# Patient Record
Sex: FEMALE | Race: WHITE | Employment: UNEMPLOYED | URBAN - METROPOLITAN AREA
[De-identification: names, ages, dates, MRNs, and addresses within clinical notes are randomized per-mention and may not be internally consistent; named-entity substitution may affect disease eponyms.]

---

## 2018-01-08 ENCOUNTER — HOSPITAL ENCOUNTER (OUTPATIENT)
Dept: MAMMOGRAPHY | Age: 58
Discharge: HOME | End: 2018-01-08
Attending: INTERNAL MEDICINE
Payer: COMMERCIAL

## 2018-01-08 ENCOUNTER — OFFICE VISIT (OUTPATIENT)
Dept: INTERNAL MEDICINE CLINIC | Age: 58
End: 2018-01-08

## 2018-01-08 VITALS
TEMPERATURE: 98.1 F | BODY MASS INDEX: 19.15 KG/M2 | DIASTOLIC BLOOD PRESSURE: 70 MMHG | OXYGEN SATURATION: 99 % | RESPIRATION RATE: 16 BRPM | HEIGHT: 67 IN | HEART RATE: 62 BPM | WEIGHT: 122 LBS | SYSTOLIC BLOOD PRESSURE: 112 MMHG

## 2018-01-08 DIAGNOSIS — K90.0 CELIAC DISEASE: ICD-10-CM

## 2018-01-08 DIAGNOSIS — Z12.11 COLON CANCER SCREENING: ICD-10-CM

## 2018-01-08 DIAGNOSIS — Z12.39 BREAST CANCER SCREENING: ICD-10-CM

## 2018-01-08 DIAGNOSIS — D35.2 PITUITARY ADENOMA (HCC): ICD-10-CM

## 2018-01-08 DIAGNOSIS — Z00.00 WELL WOMAN EXAM (NO GYNECOLOGICAL EXAM): Primary | ICD-10-CM

## 2018-01-08 PROCEDURE — 77063 BREAST TOMOSYNTHESIS BI: CPT

## 2018-01-08 NOTE — MR AVS SNAPSHOT
Visit Information Date & Time Provider Department Dept. Phone Encounter #  
 1/8/2018  1:30 PM Carlos Musa MD [facility] [address] [id number] Upcoming Health Maintenance Date Due Hepatitis C Screening 1960 COLONOSCOPY 1/24/1979 BREAST CANCER SCRN MAMMOGRAM 2/9/2017 DTaP/Tdap/Td series (2 - Td) 1/1/2020 Allergies as of 1/8/2018  Review Complete On: 1/8/2018 By: Carlos Musa MD  
  
 Severity Noted Reaction Type Reactions Contrast Agent [Iodine] Medium 05/12/2012   Topical Rash Penicillin G  08/28/2014    Other (comments) In childhood reaction unknown Current Immunizations  Reviewed on 1/8/2018 Name Date Hep A Vaccine (Adult) 4/6/2016 Tdap 1/1/2010 Reviewed by Carlos Musa MD on 1/8/2018 at  1:53 PM  
You Were Diagnosed With   
  
 Codes Comments Well woman exam (no gynecological exam)    -  Primary ICD-10-CM: Z00.00 ICD-9-CM: V70.0 Breast cancer screening     ICD-10-CM: Z12.31 
ICD-9-CM: V76.10 Colon cancer screening     ICD-10-CM: Z12.11 ICD-9-CM: V76.51 Celiac disease     ICD-10-CM: K90.0 ICD-9-CM: 579.0 Pituitary adenoma ([facility]     ICD-10-CM: D35.2 ICD-9-CM: 227.3 Vitals BP Pulse Temp Resp Height(growth percentile) Weight(growth percentile) 112/70 (BP 1 Location: Left arm, BP Patient Position: Sitting) 62 98.1 °F (36.7 °C) (Oral) 16 5' 7\" (1.702 m) 122 lb (55.3 kg) SpO2 BMI OB Status Smoking Status 99% 19.11 kg/m2 Hysterectomy Never Smoker Vitals History BMI and BSA Data Body Mass Index Body Surface Area  
 19.11 kg/m 2 1.62 m 2 Preferred Pharmacy [facility] [address] [phone] Your Updated Medication List  
  
   
This list is accurate as of: 1/8/18  1:57 PM.  Always use your most recent med list.  
  
  
  
  
 albuterol 90 mcg/actuation inhaler Commonly known as:  PROAIR HFA Take 1 Puff by inhalation every four (4) hours as needed for Wheezing or Shortness of Breath. We Performed the Following CBC WITH AUTOMATED DIFF [79901 CPT(R)] HEPATITIS C AB [86012 CPT(R)] LIPID PANEL [98086 CPT(R)] METABOLIC PANEL, COMPREHENSIVE [55942 CPT(R)] OCCULT BLOOD, IMMUNOASSAY (FIT) [id number] CPT(R)] PROLACTIN [98575 CPT(R)] T4, FREE [id number] CPT(R)] TSH 3RD GENERATION [01518 CPT(R)] URINALYSIS W/ RFLX MICROSCOPIC [87910 CPT(R)] To-Do List   
 01/08/2018 Imaging:  RENEE 3D DEE DEE W MAMMO BI SCREENING INCL CAD Introducing \[facility] [place] [facility]\"" & HEALTH SERVICES! [facility] introduces [employer] patient portal. Now you can access parts of your medical record, email your doctor's office, and request medication refills online. 1. In your internet browser, go to [email or web address] 2. Click on the First Time User? Click Here link in the Sign In box. You will see the New Member Sign Up page. 3. Enter your [employer] Access Code exactly as it appears below. You will not need to use this code after youve completed the sign-up process. If you do not sign up before the expiration date, you must request a new code. · [employer] Access Code: [email or web address] Expires: 4/8/2018  1:57 PM 
 
4. Enter the last four digits of your Social Security Number (xxxx) and Date of Birth (mm/dd/yyyy) as indicated and click Submit. You will be taken to the next sign-up page. 5. Create a [employer] ID. This will be your [employer] login ID and cannot be changed, so think of one that is secure and easy to remember. 6. Create a [employer] password. You can change your password at any time. 7. Enter your Password Reset Question and Answer. This can be used at a later time if you forget your password. 8. Enter your e-mail address. You will receive e-mail notification when new information is available in [address] 9. Click Sign Up. You can now view and download portions of your medical record. 10. Click the Download Summary menu link to download a portable copy of your medical information. If you have questions, please visit the Frequently Asked Questions section of the [employer] website. Remember, [employer] is NOT to be used for urgent needs. For medical emergencies, dial 911. Now available from your iPhone and Android! Please provide this summary of care documentation to your next provider. Your primary care clinician is listed as Lacey Varner. If you have any questions after today's visit, please call [phone].

## 2018-01-09 LAB
ALBUMIN SERPL-MCNC: 5 G/DL (ref 3.5–5.5)
ALBUMIN/GLOB SERPL: 2.1 {RATIO} (ref 1.2–2.2)
ALP SERPL-CCNC: 84 IU/L (ref 39–117)
ALT SERPL-CCNC: 13 IU/L (ref 0–32)
AST SERPL-CCNC: 16 IU/L (ref 0–40)
BASOPHILS # BLD AUTO: 0 X10E3/UL (ref 0–0.2)
BASOPHILS NFR BLD AUTO: 0 %
BILIRUB SERPL-MCNC: 0.8 MG/DL (ref 0–1.2)
BUN SERPL-MCNC: 10 MG/DL (ref 6–24)
BUN/CREAT SERPL: 14 (ref 9–23)
CALCIUM SERPL-MCNC: 9.7 MG/DL (ref 8.7–10.2)
CHLORIDE SERPL-SCNC: 99 MMOL/L (ref 96–106)
CHOLEST SERPL-MCNC: 195 MG/DL (ref 100–199)
CO2 SERPL-SCNC: 24 MMOL/L (ref 18–29)
CREAT SERPL-MCNC: 0.7 MG/DL (ref 0.57–1)
EOSINOPHIL # BLD AUTO: 0 X10E3/UL (ref 0–0.4)
EOSINOPHIL NFR BLD AUTO: 1 %
ERYTHROCYTE [DISTWIDTH] IN BLOOD BY AUTOMATED COUNT: 13.7 % (ref 12.3–15.4)
GLOBULIN SER CALC-MCNC: 2.4 G/DL (ref 1.5–4.5)
GLUCOSE SERPL-MCNC: 83 MG/DL (ref 65–99)
GLUCOSE UR QL: NORMAL
HCT VFR BLD AUTO: 43 % (ref 34–46.6)
HCV AB S/CO SERPL IA: <0.1 S/CO RATIO (ref 0–0.9)
HDLC SERPL-MCNC: 82 MG/DL
HGB BLD-MCNC: 13.9 G/DL (ref 11.1–15.9)
IMM GRANULOCYTES # BLD: 0 X10E3/UL (ref 0–0.1)
IMM GRANULOCYTES NFR BLD: 0 %
INTERPRETATION, 910389: NORMAL
KETONES UR QL STRIP: NORMAL
LDLC SERPL CALC-MCNC: 101 MG/DL (ref 0–99)
LYMPHOCYTES # BLD AUTO: 1.6 X10E3/UL (ref 0.7–3.1)
LYMPHOCYTES NFR BLD AUTO: 34 %
MCH RBC QN AUTO: 29.6 PG (ref 26.6–33)
MCHC RBC AUTO-ENTMCNC: 32.3 G/DL (ref 31.5–35.7)
MCV RBC AUTO: 92 FL (ref 79–97)
MONOCYTES # BLD AUTO: 0.4 X10E3/UL (ref 0.1–0.9)
MONOCYTES NFR BLD AUTO: 8 %
NEUTROPHILS # BLD AUTO: 2.8 X10E3/UL (ref 1.4–7)
NEUTROPHILS NFR BLD AUTO: 57 %
PH UR STRIP: NORMAL [PH]
PLATELET # BLD AUTO: 257 X10E3/UL (ref 150–379)
POTASSIUM SERPL-SCNC: 4 MMOL/L (ref 3.5–5.2)
PROLACTIN SERPL-MCNC: 19.7 NG/ML (ref 4.8–23.3)
PROT SERPL-MCNC: 7.4 G/DL (ref 6–8.5)
PROT UR QL STRIP: NORMAL
RBC # BLD AUTO: 4.69 X10E6/UL (ref 3.77–5.28)
SODIUM SERPL-SCNC: 141 MMOL/L (ref 134–144)
SP GR UR: NORMAL
T4 FREE SERPL-MCNC: 1.18 NG/DL (ref 0.82–1.77)
TRIGL SERPL-MCNC: 58 MG/DL (ref 0–149)
TSH SERPL DL<=0.005 MIU/L-ACNC: 1.83 UIU/ML (ref 0.45–4.5)
VLDLC SERPL CALC-MCNC: 12 MG/DL (ref 5–40)
WBC # BLD AUTO: 4.9 X10E3/UL (ref 3.4–10.8)

## 2018-01-09 NOTE — PROGRESS NOTES
Subjective:   65 y.o. female for Well Woman Check. No LMP recorded. Patient has had a hysterectomy. Social History: not sexually active. Pertinent past medical hstory: colonoscopy about 8 years ago-will try to get records from dr Dominique Harley  No recent mammo  Declines flu shot  No recent sxs-here because her  is encouraging her to get an exam.    Patient Active Problem List    Diagnosis Date Noted    Basal cell cancer 08/28/2014    Pituitary adenoma ([facility] 08/28/2014    Lupus erythematosus 08/28/2014    Celiac disease 08/28/2014    Alpha-1-antitrypsin deficiency carrier ([facility] 08/28/2014     Current Outpatient Prescriptions   Medication Sig Dispense Refill    albuterol (PROAIR HFA) 90 mcg/actuation inhaler Take 1 Puff by inhalation every four (4) hours as needed for Wheezing or Shortness of Breath. 1 Inhaler 0     Allergies   Allergen Reactions    Contrast Agent [Iodine] Rash    Penicillin G Other (comments)     In childhood reaction unknown     History reviewed. No pertinent past medical history. Past Surgical History:   Procedure Laterality Date    HX GYN      hysterectomy for fibroids age 22     History reviewed. No pertinent family history. Social History   Substance Use Topics    Smoking status: Never Smoker    Smokeless tobacco: Never Used    Alcohol use Yes      Comment: socially        ROS:  Feeling well. No dyspnea or chest pain on exertion. No abdominal pain, change in bowel habits, black or bloody stools. No urinary tract symptoms. GYN ROS: no breast pain or new or enlarging lumps on self exam, no vaginal bleeding, no discharge or pelvic pain. No neurological complaints. Objective:     Visit Vitals    /70 (BP 1 Location: Left arm, BP Patient Position: Sitting)    Pulse 62    Temp 98.1 °F (36.7 °C) (Oral)    Resp 16    Ht 5' 7\" (1.702 m)    Wt 122 lb (55.3 kg)    SpO2 99%    BMI 19.11 kg/m2     The patient appears well, alert, oriented x 3, in no distress.   ENT normal.  Neck supple. No adenopathy or thyromegaly. TAMERA. Lungs are clear, good air entry, no wheezes, rhonchi or rales. S1 and S2 normal, no murmurs, regular rate and rhythm. Abdomen soft without tenderness, guarding, mass or organomegaly. Extremities show no edema, normal peripheral pulses. Neurological is normal, no focal findings. BREAST EXAM: breasts appear normal, no suspicious masses, no skin or nipple changes or axillary nodes    PELVIC EXAM: examination not indicated    Assessment/Plan:   well woman  mammogram  Diagnoses and all orders for this visit:    1. Well woman exam (no gynecological exam)  -     METABOLIC PANEL, COMPREHENSIVE  -     LIPID PANEL  -     URINALYSIS W/ RFLX MICROSCOPIC  -     HEPATITIS C AB    2. Breast cancer screening  -     RENEE 3D DEE DEE W MAMMO BI SCREENING INCL CAD; Future    3. Colon cancer screening  -     OCCULT BLOOD, IMMUNOASSAY (FIT)  -     CBC WITH AUTOMATED DIFF    4. Celiac disease    5. Pituitary adenoma (HCC)-she stopped her cabergoline-denies any sxs of ha or visual changes or galactorrhea  -     TSH 3RD GENERATION  -     T4, FREE  -     PROLACTIN    .

## 2018-06-20 ENCOUNTER — OFFICE VISIT (OUTPATIENT)
Dept: INTERNAL MEDICINE CLINIC | Age: 58
End: 2018-06-20

## 2018-06-20 VITALS
RESPIRATION RATE: 16 BRPM | SYSTOLIC BLOOD PRESSURE: 97 MMHG | OXYGEN SATURATION: 98 % | BODY MASS INDEX: 19.62 KG/M2 | DIASTOLIC BLOOD PRESSURE: 47 MMHG | TEMPERATURE: 98.7 F | HEIGHT: 67 IN | WEIGHT: 125 LBS

## 2018-06-20 DIAGNOSIS — N75.8 BARTHOLIN'S GLAND INFECTION: Primary | ICD-10-CM

## 2018-06-20 RX ORDER — DOXYCYCLINE 100 MG/1
100 TABLET ORAL 2 TIMES DAILY
Qty: 14 TAB | Refills: 0 | Status: SHIPPED | OUTPATIENT
Start: 2018-06-20 | End: 2019-03-13 | Stop reason: ALTCHOICE

## 2018-06-20 NOTE — PROGRESS NOTES
Subjective:    Elizabeth Hi is a 63 y.o. female who presents for infected cyst right labia-present for last 2-3 days and increasing painful no fevers or discharge from [facility]:   Patient appears well. Visit Vitals    BP 97/47 (BP 1 Location: Left arm, BP Patient Position: Sitting)    Temp 98.7 °F (37.1 °C) (Oral)    Resp 16    Ht 5' 7\" (1.702 m)    Wt 125 lb (56.7 kg)    SpO2 98%    BMI 19.58 kg/m2     Skin: just below right labia majora she ha s a walnut sized firm red tender infected bartholins cyst no inguinal nodes    Assessment:   Infected bartholins cyst    Procedure Note:   After informed consent was obtained, using alcohol for cleansing and 1% lidocaine for anesthetic, with sterile technique, I and d of abscess was performed. Antibiotic dressing is applied, and wound care instructions provided. Be alert for any signs of cutaneous infection. The procedure was well tolerated without complications. Follow up: purulent material was drained and she tolerated it well. .     Started on keflex and advised use warm compresses to area  ,Follow up if signs and symptoms worsen or change. After hours number given.

## 2019-03-12 ENCOUNTER — TELEPHONE (OUTPATIENT)
Dept: INTERNAL MEDICINE CLINIC | Age: 59
End: 2019-03-12

## 2019-03-12 NOTE — TELEPHONE ENCOUNTER
Patient reports left eye swelling & pain that has been going on for 3 days now. She states day 1 her eye was tingling, the next day it was red & the 3rd day it was painful. She notes some vision changes yesterday but today her vision seems fine. Appt scheduled with PCP tomorrow at 2:30.

## 2019-03-12 NOTE — TELEPHONE ENCOUNTER
----- Message from Sidra Saenz sent at 3/12/2019  4:33 PM EDT -----  Regarding: Dr. Carlos Coyle  Pt requesting a call from nurse this evening regarding pain and swelling in her left eye. No appointments available tomorrow within pt's timeframe. Best contact is [phone].

## 2019-03-13 ENCOUNTER — OFFICE VISIT (OUTPATIENT)
Dept: INTERNAL MEDICINE CLINIC | Age: 59
End: 2019-03-13

## 2019-03-13 VITALS
TEMPERATURE: 98.2 F | HEIGHT: 67 IN | WEIGHT: 126 LBS | SYSTOLIC BLOOD PRESSURE: 123 MMHG | OXYGEN SATURATION: 98 % | BODY MASS INDEX: 19.78 KG/M2 | DIASTOLIC BLOOD PRESSURE: 77 MMHG | RESPIRATION RATE: 16 BRPM | HEART RATE: 63 BPM

## 2019-03-13 DIAGNOSIS — H00.015 HORDEOLUM EXTERNUM LEFT LOWER EYELID: Primary | ICD-10-CM

## 2019-03-13 RX ORDER — ERYTHROMYCIN 5 MG/G
OINTMENT OPHTHALMIC
Qty: 1 G | Refills: 1 | Status: ON HOLD | OUTPATIENT
Start: 2019-03-13 | End: 2019-11-05

## 2019-03-13 NOTE — PROGRESS NOTES
HISTORY OF PRESENT ILLNESS  Tomeka Weiner is a 63 y.o. female. HPI  48 hours of swelling and redness of left lower lid, no change in vision, no injury, no fevers or chills. A little puffiness underneath left eye. Review of Systems   Constitutional: Negative. Negative for chills, diaphoresis, fever and malaise/fatigue. HENT: Negative for congestion, ear discharge, ear pain, hearing loss, nosebleeds and sore throat. Eyes: Positive for redness. Negative for blurred vision, double vision, pain and discharge. Respiratory: Negative for cough, hemoptysis, sputum production, shortness of breath and wheezing. Cardiovascular: Negative for chest pain. Skin: Negative for rash. Neurological: Negative for headaches. Physical Exam   Constitutional: She is oriented to person, place, and time. She appears well-developed and well-nourished. HENT:   Head: Normocephalic. Right Ear: Tympanic membrane, external ear and ear canal normal.   Left Ear: Tympanic membrane, external ear and ear canal normal.   Nose: Nose normal.   Mouth/Throat: Oropharynx is clear and moist and mucous membranes are normal. No oropharyngeal exudate. Eyes: Conjunctivae are normal. Pupils are equal, round, and reactive to light. Right eye exhibits no discharge. Left eye exhibits no discharge. Left lower lid with papule and some redness of lid no discharge seen no facial tenderness   Neck: Normal range of motion. Neck supple. Cardiovascular: Normal rate, regular rhythm and normal heart sounds. Pulmonary/Chest: Effort normal and breath sounds normal. No respiratory distress. She has no wheezes. She has no rales. Lymphadenopathy:     She has no cervical adenopathy. Neurological: She is alert and oriented to person, place, and time. Skin: No rash noted. Nursing note and vitals reviewed. ASSESSMENT and PLAN  Diagnoses and all orders for this visit:    1.  Hordeolum externum left lower eyelid  -     erythromycin (ILOTYCIN) ophthalmic ointment; Topically to left lid tid    Compresses and lid scrubs  Follow up if signs and symptoms worsen or change. After hours number given.

## 2019-11-04 ENCOUNTER — HOSPITAL ENCOUNTER (INPATIENT)
Age: 59
LOS: 4 days | Discharge: HOME | DRG: 885 | End: 2019-11-08
Attending: EMERGENCY MEDICINE | Admitting: PSYCHIATRY & NEUROLOGY
Payer: COMMERCIAL

## 2019-11-04 ENCOUNTER — OFFICE VISIT (OUTPATIENT)
Dept: INTERNAL MEDICINE CLINIC | Age: 59
End: 2019-11-04

## 2019-11-04 ENCOUNTER — APPOINTMENT (OUTPATIENT)
Dept: CT IMAGING | Age: 59
DRG: 885 | End: 2019-11-04
Attending: EMERGENCY MEDICINE
Payer: COMMERCIAL

## 2019-11-04 VITALS
BODY MASS INDEX: 19.73 KG/M2 | TEMPERATURE: 98.3 F | RESPIRATION RATE: 16 BRPM | HEART RATE: 88 BPM | OXYGEN SATURATION: 98 % | HEIGHT: 67 IN | SYSTOLIC BLOOD PRESSURE: 150 MMHG | DIASTOLIC BLOOD PRESSURE: 103 MMHG

## 2019-11-04 DIAGNOSIS — D35.2 PITUITARY ADENOMA (HCC): ICD-10-CM

## 2019-11-04 DIAGNOSIS — F30.9 MANIA (HCC): ICD-10-CM

## 2019-11-04 DIAGNOSIS — I10 HTN, GOAL BELOW 130/80: ICD-10-CM

## 2019-11-04 DIAGNOSIS — F29 PSYCHOSIS, UNSPECIFIED PSYCHOSIS TYPE (HCC): Primary | ICD-10-CM

## 2019-11-04 DIAGNOSIS — R45.1 RESTLESSNESS AND AGITATION: Primary | ICD-10-CM

## 2019-11-04 PROBLEM — F31.10 BIPOLAR AFFECTIVE DISORDER, CURRENT EPISODE MANIC (HCC): Status: ACTIVE | Noted: 2019-11-04

## 2019-11-04 LAB
ALBUMIN SERPL-MCNC: 4 G/DL (ref 3.5–5)
ALBUMIN/GLOB SERPL: 1.2 {RATIO} (ref 1.1–2.2)
ALP SERPL-CCNC: 91 U/L (ref 45–117)
ALT SERPL-CCNC: 26 U/L (ref 12–78)
AMPHET UR QL SCN: NEGATIVE
ANION GAP SERPL CALC-SCNC: 6 MMOL/L (ref 5–15)
APPEARANCE UR: CLEAR
AST SERPL-CCNC: 15 U/L (ref 15–37)
BACTERIA URNS QL MICRO: NEGATIVE /HPF
BARBITURATES UR QL SCN: NEGATIVE
BASOPHILS # BLD: 0 K/UL (ref 0–0.1)
BASOPHILS NFR BLD: 0 % (ref 0–1)
BENZODIAZ UR QL: NEGATIVE
BILIRUB SERPL-MCNC: 0.3 MG/DL (ref 0.2–1)
BILIRUB UR QL: NEGATIVE
BUN SERPL-MCNC: 10 MG/DL (ref 6–20)
BUN/CREAT SERPL: 13 (ref 12–20)
CALCIUM SERPL-MCNC: 8.9 MG/DL (ref 8.5–10.1)
CANNABINOIDS UR QL SCN: NEGATIVE
CHLORIDE SERPL-SCNC: 105 MMOL/L (ref 97–108)
CK SERPL-CCNC: 72 U/L (ref 26–192)
CO2 SERPL-SCNC: 30 MMOL/L (ref 21–32)
COCAINE UR QL SCN: NEGATIVE
COLOR UR: ABNORMAL
CREAT SERPL-MCNC: 0.78 MG/DL (ref 0.55–1.02)
DIFFERENTIAL METHOD BLD: NORMAL
DRUG SCRN COMMENT,DRGCM: NORMAL
EOSINOPHIL # BLD: 0.1 K/UL (ref 0–0.4)
EOSINOPHIL NFR BLD: 1 % (ref 0–7)
EPITH CASTS URNS QL MICRO: ABNORMAL /LPF
ERYTHROCYTE [DISTWIDTH] IN BLOOD BY AUTOMATED COUNT: 12 % (ref 11.5–14.5)
ETHANOL SERPL-MCNC: <10 MG/DL
GLOBULIN SER CALC-MCNC: 3.4 G/DL (ref 2–4)
GLUCOSE SERPL-MCNC: 94 MG/DL (ref 65–100)
GLUCOSE UR STRIP.AUTO-MCNC: NEGATIVE MG/DL
HCT VFR BLD AUTO: 42.5 % (ref 35–47)
HGB BLD-MCNC: 13.5 G/DL (ref 11.5–16)
HGB UR QL STRIP: NEGATIVE
HYALINE CASTS URNS QL MICRO: ABNORMAL /LPF (ref 0–5)
IMM GRANULOCYTES # BLD AUTO: 0 K/UL (ref 0–0.04)
IMM GRANULOCYTES NFR BLD AUTO: 0 % (ref 0–0.5)
KETONES UR QL STRIP.AUTO: NEGATIVE MG/DL
LEUKOCYTE ESTERASE UR QL STRIP.AUTO: ABNORMAL
LYMPHOCYTES # BLD: 1.9 K/UL (ref 0.8–3.5)
LYMPHOCYTES NFR BLD: 34 % (ref 12–49)
MCH RBC QN AUTO: 29.6 PG (ref 26–34)
MCHC RBC AUTO-ENTMCNC: 31.8 G/DL (ref 30–36.5)
MCV RBC AUTO: 93.2 FL (ref 80–99)
METHADONE UR QL: NEGATIVE
MONOCYTES # BLD: 0.5 K/UL (ref 0–1)
MONOCYTES NFR BLD: 9 % (ref 5–13)
NEUTS SEG # BLD: 3 K/UL (ref 1.8–8)
NEUTS SEG NFR BLD: 56 % (ref 32–75)
NITRITE UR QL STRIP.AUTO: NEGATIVE
NRBC # BLD: 0 K/UL (ref 0–0.01)
NRBC BLD-RTO: 0 PER 100 WBC
OPIATES UR QL: NEGATIVE
PCP UR QL: NEGATIVE
PH UR STRIP: 7.5 [PH] (ref 5–8)
PLATELET # BLD AUTO: 250 K/UL (ref 150–400)
PMV BLD AUTO: 10.4 FL (ref 8.9–12.9)
POTASSIUM SERPL-SCNC: 3.2 MMOL/L (ref 3.5–5.1)
PROT SERPL-MCNC: 7.4 G/DL (ref 6.4–8.2)
PROT UR STRIP-MCNC: NEGATIVE MG/DL
RBC # BLD AUTO: 4.56 M/UL (ref 3.8–5.2)
RBC #/AREA URNS HPF: ABNORMAL /HPF (ref 0–5)
SODIUM SERPL-SCNC: 141 MMOL/L (ref 136–145)
SP GR UR REFRACTOMETRY: 1.01 (ref 1–1.03)
TROPONIN I SERPL-MCNC: <0.05 NG/ML
UR CULT HOLD, URHOLD: NORMAL
UROBILINOGEN UR QL STRIP.AUTO: 0.2 EU/DL (ref 0.2–1)
WBC # BLD AUTO: 5.5 K/UL (ref 3.6–11)
WBC URNS QL MICRO: ABNORMAL /HPF (ref 0–4)

## 2019-11-04 PROCEDURE — 65220000003 HC RM SEMIPRIVATE PSYCH

## 2019-11-04 PROCEDURE — 81001 URINALYSIS AUTO W/SCOPE: CPT

## 2019-11-04 PROCEDURE — 80307 DRUG TEST PRSMV CHEM ANLYZR: CPT

## 2019-11-04 PROCEDURE — 99284 EMERGENCY DEPT VISIT MOD MDM: CPT

## 2019-11-04 PROCEDURE — 36415 COLL VENOUS BLD VENIPUNCTURE: CPT

## 2019-11-04 PROCEDURE — 82550 ASSAY OF CK (CPK): CPT

## 2019-11-04 PROCEDURE — 84484 ASSAY OF TROPONIN QUANT: CPT

## 2019-11-04 PROCEDURE — 70450 CT HEAD/BRAIN W/O DYE: CPT

## 2019-11-04 PROCEDURE — 85025 COMPLETE CBC W/AUTO DIFF WBC: CPT

## 2019-11-04 PROCEDURE — 93005 ELECTROCARDIOGRAM TRACING: CPT

## 2019-11-04 PROCEDURE — 90791 PSYCH DIAGNOSTIC EVALUATION: CPT

## 2019-11-04 PROCEDURE — 80053 COMPREHEN METABOLIC PANEL: CPT

## 2019-11-04 RX ORDER — ADHESIVE BANDAGE
30 BANDAGE TOPICAL DAILY PRN
Status: DISCONTINUED | OUTPATIENT
Start: 2019-11-04 | End: 2019-11-08 | Stop reason: HOSPADM

## 2019-11-04 RX ORDER — LORAZEPAM 2 MG/ML
1 INJECTION INTRAMUSCULAR
Status: DISCONTINUED | OUTPATIENT
Start: 2019-11-04 | End: 2019-11-08 | Stop reason: HOSPADM

## 2019-11-04 RX ORDER — HYDROXYZINE 50 MG/1
50 TABLET, FILM COATED ORAL
Status: DISCONTINUED | OUTPATIENT
Start: 2019-11-04 | End: 2019-11-08 | Stop reason: HOSPADM

## 2019-11-04 RX ORDER — BENZTROPINE MESYLATE 1 MG/1
1 TABLET ORAL
Status: DISCONTINUED | OUTPATIENT
Start: 2019-11-04 | End: 2019-11-08 | Stop reason: HOSPADM

## 2019-11-04 RX ORDER — ACETAMINOPHEN 325 MG/1
650 TABLET ORAL
Status: DISCONTINUED | OUTPATIENT
Start: 2019-11-04 | End: 2019-11-08 | Stop reason: HOSPADM

## 2019-11-04 RX ORDER — DIPHENHYDRAMINE HYDROCHLORIDE 50 MG/ML
50 INJECTION, SOLUTION INTRAMUSCULAR; INTRAVENOUS
Status: DISCONTINUED | OUTPATIENT
Start: 2019-11-04 | End: 2019-11-08 | Stop reason: HOSPADM

## 2019-11-04 RX ORDER — OLANZAPINE 5 MG/1
5 TABLET ORAL
Status: DISCONTINUED | OUTPATIENT
Start: 2019-11-04 | End: 2019-11-08 | Stop reason: HOSPADM

## 2019-11-04 RX ORDER — HALOPERIDOL 5 MG/ML
5 INJECTION INTRAMUSCULAR
Status: DISCONTINUED | OUTPATIENT
Start: 2019-11-04 | End: 2019-11-08 | Stop reason: HOSPADM

## 2019-11-04 RX ORDER — TRAZODONE HYDROCHLORIDE 50 MG/1
50 TABLET ORAL
Status: DISCONTINUED | OUTPATIENT
Start: 2019-11-04 | End: 2019-11-05

## 2019-11-04 NOTE — ED PROVIDER NOTES
63 y.o. female with no significant past medical history who presents from home via personal vehicle with chief complaint of AMS. Patient history provided by . Patient took a trip to [facility] on Oct. 22nd, and while the her group in [place] she started yelling out and becoming agitated with people.  states the patient was left in her room and booked an early flight home, but was kicked off the flight due to possible agitation towards staff.  flew over to [facility] to  the patient, but upon arrival found out the patient had fled the airport and was missing in [place]. After 3 days, the patient was finally found and was able to be brought back to the [place] While coming back from airport in [facility] the patient was reportedly seeing \"angels\" and yelling at passengers.  states for the past 3 years the patient has yelled out in Jain often, but never saw it as a concern. Patient has had no previous psychiatric admissions or medications. Patient refuses to talk about her trip due to \"spiritual direction\" and was noncompliant with most questions asked. There are no other acute medical concerns at this time. Social hx: Social EtOH  PCP: Kash Owens MD    Full history, physical exam, and ROS unable to be obtained due to:  confusion. Note written by Emili Fulton, as dictated by Elenita Granados MD 4:18 PM       The history is provided by the patient and the spouse. The history is limited by the condition of the patient. No  was used. History reviewed. No pertinent past medical history. Past Surgical History:   Procedure Laterality Date    HX GYN      hysterectomy for fibroids age 22         History reviewed. No pertinent family history.     Social History     Socioeconomic History    Marital status:      Spouse name: Not on file    Number of children: Not on file    Years of education: Not on file    Highest education level: Not on file   Occupational History    Not on file   Social Needs    Financial resource strain: Not on file    Food insecurity:     Worry: Not on file     Inability: Not on file    Transportation needs:     Medical: Not on file     Non-medical: Not on file   Tobacco Use    Smoking status: Never Smoker    Smokeless tobacco: Never Used   Substance and Sexual Activity    Alcohol use: Yes     Comment: socially    Drug use: No    Sexual activity: Yes     Partners: Male   Lifestyle    Physical activity:     Days per week: Not on file     Minutes per session: Not on file    Stress: Not on file   Relationships    Social connections:     Talks on phone: Not on file     Gets together: Not on file     Attends Taoism service: Not on file     Active member of club or organization: Not on file     Attends meetings of clubs or organizations: Not on file     Relationship status: Not on file    Intimate partner violence:     Fear of current or ex partner: Not on file     Emotionally abused: Not on file     Physically abused: Not on file     Forced sexual activity: Not on file   Other Topics Concern    Not on file   Social History Narrative    Not on file         ALLERGIES: Contrast agent [iodine] and Penicillin g    Review of Systems   Unable to perform ROS: Mental status change       Vitals:    11/04/19 1533   BP: 151/75   Pulse: 71   Resp: 16   Temp: 97.9 °F (36.6 °C)   SpO2: 99%            Physical Exam   Constitutional: She is oriented to person, place, and time. She appears well-developed and well-nourished. No distress. HENT:   Head: Normocephalic and atraumatic. Right Ear: External ear normal.   Left Ear: External ear normal.   Eyes: Conjunctivae and EOM are normal.   Neck: Normal range of motion. Neck supple. No tracheal deviation present. No thyromegaly present. Cardiovascular: Normal rate, regular rhythm, normal heart sounds and intact distal pulses. Exam reveals no gallop and no friction rub.    No murmur heard.  Pulmonary/Chest: Effort normal and breath sounds normal. No respiratory distress. She has no wheezes. She has no rales. She exhibits no tenderness. Abdominal: Soft. Bowel sounds are normal. She exhibits no distension. There is no tenderness. Musculoskeletal: Normal range of motion. She exhibits no edema, tenderness or deformity. Neurological: She is alert and oriented to person, place, and time. She displays normal reflexes. No cranial nerve deficit. She exhibits normal muscle tone. Coordination normal.   Skin: Skin is warm and dry. No rash noted. She is not diaphoretic. No erythema. Psychiatric: She has a normal mood and affect. Her behavior is normal. Judgment and thought content normal.   Focused on Jainism topics. Not answering questions except redirecting answers towards Jainism topics. Note written by Emili Santos, as dictated by Norma Sánchez MD 4:18 PM       MDM  Number of Diagnoses or Management Options  Diagnosis management comments: 50-year-old white female presents with mental health problem. Patient has been having manic type symptoms. She is currently awake and alert and focused on Jainism topics. Will check basic blood work. Will have psychiatry see her. Will reassess shortly. Patient agrees. Patient is here with her  and currently calm and cooperative.        Amount and/or Complexity of Data Reviewed  Clinical lab tests: ordered and reviewed  Tests in the radiology section of CPT®: ordered and reviewed  Tests in the medicine section of CPT®: ordered and reviewed  Discussion of test results with the performing providers: yes  Decide to obtain previous medical records or to obtain history from someone other than the patient: yes  Obtain history from someone other than the patient: yes  Review and summarize past medical records: yes  Discuss the patient with other providers: yes  Independent visualization of images, tracings, or specimens: yes    Risk of Complications, Morbidity, and/or Mortality  Presenting problems: high  Diagnostic procedures: high  Management options: high           Procedures    ED EKG interpretation: 16:32  Rhythm: sinus bradycardia; and regular . Rate (approx.): 54; Axis: normal; ST/T wave: No ST elevation or depression;   Note written by Emili Maki, as dictated by Nkechi Leigh MD 4:43 PM     PROGRESS NOTE:  5:13 PM  According to [facility], crisis is coming to evaluate the patient    6:35 PM  Labs are WNL  UA is clear  CT head is negative  Pt is medically cleared    6:36 PM  Crisis is here to see pt     PROGRESS NOTE:  9:23 PM  Patient will be admitted here at [facility] to psychiatry

## 2019-11-04 NOTE — PROGRESS NOTES
HISTORY OF PRESENT ILLNESS  Benoit Morataya is a 63 y.o. female. HPI  Brought in by  and her , Father Margaret Foley. She has been having increasing agitation and behavioral abnormalities. Went to a [facility] in [facility] in October. She was on a tour and while there would not follow the tour guides and had to be watched by two people during all times. She flew back and was taken off the airplane because of her agitated behavior. She wound up spending two nights sleeping in the airport. Her  went at the end of October and got her from the airport. He found her and she had been wandering in the streets, confused and disoriented. She tells me that her  has taken her passport and that there is nothing wrong with her, but that she will only listen to what her , Father Margaret Foley, tells her what to do. She has been off Cabergoline for over a year. History of pituitary adenoma, but no diagnosis of mental health issues in the past.      Review of Systems   Unable to perform ROS: Psychiatric disorder       Physical Exam   Psychiatric:   Agitated throws her arms up and yells I am saved from the [place]  Also states her  took her passport  Tearful at times in the office and at other times yelling       ASSESSMENT and PLAN  Diagnoses and all orders for this visit:    1. Restlessness and agitation    2. HTN, goal below 130/80    3.  Pituitary adenoma ([address]    Behavior is erratic and nonsensical and I am worried re some type of manic episode versus psychosis  She is willing to  Travel with her  father Robina Harris who is in the office so he and her  Prentiss Vizcaino will take her directly to [facility] for further mental  Health evaluation  Discussed with  and  in office  Over 50% of the 25 minutes face to face with Benoit Morataya consisted of counseling and/or discussing treatment plans in reference to her erratic behavior and coordinating evaluation with  and  to get her to the er she was calm with them although agitated with m e.

## 2019-11-04 NOTE — BSMART NOTE
Patient is a 64yo female who presents to the ER due to mental health evaluation. She reports she is here due to her doctor, , and  wanting her to come. She denies suicidal and homicidal ideation. She will not answer if she sees or hears things other do not hear and refused to answer if she was seeing angels. She denies any reason to be in the hospital and denies any past mental health history.  reports she is here because she went to [facility] on 10/22 and there was an issue. Her [employer] group went to [facility] and the patient was yelling at random people and not following group instructions. They were concerned and had people watching her in her hotel room until the next day when they attempted to get her on a flight home. She was kicked off the plane and they tried to schedule with another airline and she was escorted off as well. She was to wait at the airport for her  to fly there to get there but when he arrived she was missing. A missing person report was filed and she was found after 2.5 days of wandering around the streets [facility]. Her  then flew home with her but she was talking about seeing angels and yelling at people and had to have her seats moved. She then was yelling at people on the shuttle. She admits to staying up at night praying and prays most of the day. Her  reports she rarely sleeps and he is having trouble keeping an eye on her and handling her at home. She keeps asking for her passport because she wants to travel again. He is concerned for her safety due to her hyper religiousness and Yazidism preoccupation. He gave the contact information for the  that was here with them and the patient gave consent for staff to contact him and share anything with him. His name is Father Landen Toussaint and his number is [phone]. Patient is not seeking admission and is not willing to stay.   has safety concerns due to delusional thoughts and preoccupation with Jain including not sleeping. After discussion with patient's  and MD it was decided to consult [address] [place] [address] for a TDO assessment. HPD is aware of patient and will continue to monitor. Despite report patient was observed yelling in the ER waiting room, she has been pleasant and cooperative with assessment and medical clearance process.  
 
Allan Orlando [facility]

## 2019-11-04 NOTE — ED TRIAGE NOTES
Pt here with  who stated she was in [facility] and was missing x 3 days, he stated she was kicked off the plane, he stated she was wondering in the streets, pt staring off and will not answer questions , pt acting  inappropriately in triage, pt throwing arms up in the air,  to stay with her ,security notified  of pt, pt screaming in waiting room

## 2019-11-05 LAB
APAP SERPL-MCNC: <2 UG/ML (ref 10–30)
ATRIAL RATE: 54 BPM
CALCULATED P AXIS, ECG09: 79 DEGREES
CALCULATED R AXIS, ECG10: 47 DEGREES
CALCULATED T AXIS, ECG11: 67 DEGREES
DIAGNOSIS, 93000: NORMAL
P-R INTERVAL, ECG05: 144 MS
PROLACTIN SERPL-MCNC: 9.6 NG/ML
Q-T INTERVAL, ECG07: 404 MS
QRS DURATION, ECG06: 76 MS
QTC CALCULATION (BEZET), ECG08: 383 MS
SALICYLATES SERPL-MCNC: <1.7 MG/DL (ref 2.8–20)
TSH SERPL DL<=0.05 MIU/L-ACNC: 1.36 UIU/ML (ref 0.36–3.74)
VENTRICULAR RATE, ECG03: 54 BPM

## 2019-11-05 PROCEDURE — 84146 ASSAY OF PROLACTIN: CPT

## 2019-11-05 PROCEDURE — 74011250637 HC RX REV CODE- 250/637: Performed by: NURSE PRACTITIONER

## 2019-11-05 PROCEDURE — 65220000003 HC RM SEMIPRIVATE PSYCH

## 2019-11-05 PROCEDURE — 84443 ASSAY THYROID STIM HORMONE: CPT

## 2019-11-05 PROCEDURE — 36415 COLL VENOUS BLD VENIPUNCTURE: CPT

## 2019-11-05 RX ORDER — RISPERIDONE 1 MG/1
1 TABLET, FILM COATED ORAL 2 TIMES DAILY
Status: DISCONTINUED | OUTPATIENT
Start: 2019-11-05 | End: 2019-11-05

## 2019-11-05 RX ORDER — TRAZODONE HYDROCHLORIDE 50 MG/1
50 TABLET ORAL
Status: DISCONTINUED | OUTPATIENT
Start: 2019-11-05 | End: 2019-11-08 | Stop reason: HOSPADM

## 2019-11-05 RX ORDER — POTASSIUM CHLORIDE 750 MG/1
40 TABLET, FILM COATED, EXTENDED RELEASE ORAL
Status: COMPLETED | OUTPATIENT
Start: 2019-11-05 | End: 2019-11-05

## 2019-11-05 RX ADMIN — POTASSIUM CHLORIDE 40 MEQ: 750 TABLET, FILM COATED, EXTENDED RELEASE ORAL at 11:51

## 2019-11-05 NOTE — PROGRESS NOTES
Laboratory Monitoring for Antipsychotics: This patient is currently prescribed the following medication(s):   Current Facility-Administered Medications   Medication Dose Route Frequency    risperiDONE (RisperDAL) tablet 1 mg  1 mg Oral BID     The following labs have been completed for monitoring of antipsychotics and/or mood stabilizers:    Height, Weight, BMI Estimation  Estimated body mass index is 19.73 kg/m² as calculated from the following:    Height as of an earlier encounter on 11/4/19: 170.2 cm (67\"). Weight as of 3/13/19: 57.2 kg (126 lb). Vital Signs/Blood Pressure  Visit Vitals  BP (!) 129/91 (BP 1 Location: Left arm, BP Patient Position: Sitting)   Pulse 76   Temp 98.3 °F (36.8 °C)   Resp 16   SpO2 99%     Renal Function, Hepatic Function and Chemistry  CrCl cannot be calculated (Unknown ideal weight.). Lab Results   Component Value Date/Time    Sodium 141 11/04/2019 04:02 PM    Potassium 3.2 (L) 11/04/2019 04:02 PM    Chloride 105 11/04/2019 04:02 PM    CO2 30 11/04/2019 04:02 PM    Anion gap 6 11/04/2019 04:02 PM    BUN 10 11/04/2019 04:02 PM    Creatinine 0.78 11/04/2019 04:02 PM    BUN/Creatinine ratio 13 11/04/2019 04:02 PM    Bilirubin, total 0.3 11/04/2019 04:02 PM    Protein, total 7.4 11/04/2019 04:02 PM    Albumin 4.0 11/04/2019 04:02 PM    Globulin 3.4 11/04/2019 04:02 PM    A-G Ratio 1.2 11/04/2019 04:02 PM    ALT (SGPT) 26 11/04/2019 04:02 PM    Alk.  phosphatase 91 11/04/2019 04:02 PM     Lab Results   Component Value Date/Time    Glucose 94 11/04/2019 04:02 PM     No results found for: HBA1C, HGBE8, ERW3QHJI    Hematology  Lab Results   Component Value Date/Time    WBC 5.5 11/04/2019 04:02 PM    RBC 4.56 11/04/2019 04:02 PM    HGB 13.5 11/04/2019 04:02 PM    HCT 42.5 11/04/2019 04:02 PM    MCV 93.2 11/04/2019 04:02 PM    MCH 29.6 11/04/2019 04:02 PM    MCHC 31.8 11/04/2019 04:02 PM    RDW 12.0 11/04/2019 04:02 PM    PLATELET 651 45/66/1952 04:02 PM     Lipids  Lab Results Component Value Date/Time    Cholesterol, total 195 01/08/2018 12:00 AM    HDL Cholesterol 82 01/08/2018 12:00 AM    LDL, calculated 101 (H) 01/08/2018 12:00 AM    Triglyceride 58 01/08/2018 12:00 AM     Thyroid Function  Lab Results   Component Value Date/Time    TSH 1.36 11/05/2019 06:30 AM    T4, Free 1.18 01/08/2018 12:00 AM    T4, Total 7.6 09/24/2009 08:12 AM     Assessment/Plan:  Will order lipid panel and hemoglobin A1c or fasting glucose to complete the recommended baseline laboratory monitoring based on the patient's current medication regimen. Will also ask for documentation of patient's weight.      Martine Ling, LAURELD

## 2019-11-05 NOTE — BH NOTES
Pt.s sister called  Stated pt.  Has history of Lupus  Pituitary tumor  Brain bleed   Herpes  And is concerned  If they are affecting her recent hospitalization

## 2019-11-05 NOTE — BH NOTES
GROUP THERAPY PROGRESS NOTE    The patient Marissa Kowalski is participating in [employer]. Group time: 30 minutes    Personal goal for participation: to orient the patient to the unit.     Goal orientation: successful adoption of unit rules    Group therapy participation: active    Therapeutic interventions reviewed and discussed: Yes    Impression of participation:     Sheree Yañez  11/5/2019 2:51 PM

## 2019-11-05 NOTE — ED NOTES
RN called  to come to bedside. Patient would like bible. RN searched patients purse and found a wine bottle inside. RN asked for bottle and patient refused. HPD to bedside to speak with patient. HPD spoke to patient and patient's .  now has patient's purse and bottle of wine. Pt state's \"its for my  who is coming in. \" HPD going to chapel to get bible. RN offered patient something to eat and drink. Pt declined at this time. Patient to be admitted upstairs once TDO comes.

## 2019-11-05 NOTE — ROUTINE PROCESS
TRANSFER - OUT REPORT: 
 
Verbal report given to Ute Tapia (name) on Sancho Salazar  being transferred to [address] [place] [address] (unit) for routine progression of care Report consisted of patients Situation, Background, Assessment and  
Recommendations(SBAR). Information from the following report(s) SBAR, ED Summary, [facility] and Recent Results was reviewed with the receiving nurse. Lines:    
 
Opportunity for questions and clarification was provided.    
 
Patient transported with: 
 Registered Nurse & HPD

## 2019-11-05 NOTE — BH NOTES
Patient admitted per TDO to Acute  Psychiatry, under the services of . Patient currently denies suicidal ideation. Patient currently denies homicidal ideation. Patient verbally contracts for safety. Patient admits with psychotic symptoms. Pt reports ETOH use, occasionally  Pt denies drug use.

## 2019-11-05 NOTE — PROGRESS NOTES
Admission Medication Reconciliation:    Information obtained from:  Patient interview and [facility]   RxQuery data available¹:  YES    Comments/Recommendations: Updated PTA meds/reviewed patient's allergies. 1)  The patient denies taking any medications prior to admission. 2)  Medication changes (since last review):  Removed  - albuterol  - erythromycin    3)  The [place] Prescription Monitoring Program () was assessed to determine fill history of any controlled medications. The patient has NOT filled any controlled medications in the last two years. ¹RxQuery pharmacy benefit data reflects medications filled and processed through the patient's insurance, however   this data does NOT capture whether the medication was picked up or is currently being taken by the patient. Allergies:  Contrast agent [iodine] and Penicillin g    Significant PMH/Disease States: History reviewed. No pertinent past medical history.   Chief Complaint for this Admission:    Chief Complaint   Patient presents with    Mental Health Problem     Prior to Admission Medications:   None     DEVENDRA Hawthorne

## 2019-11-05 NOTE — BH NOTES
Blocked Bed Documentation:    Room number: 977-53  Type: Behavior  Rationale: Impulsive  Anticipated duration: TBD  Additional comments:Religiously preoccupied.  Can be impulsive and disruptive

## 2019-11-05 NOTE — BH NOTES
PSYCHOSOCIAL ASSESSMENT  :Patient identifying info:  Giovanny Boggs is a 63 y.o., female admitted 11/4/2019  4:00 PM     Presenting problem and precipitating factors: She was brought to the ER by her  and  due to bizarre behavior. [address] assessed and TDO due to inability to care for self. She has been increasing agitated and bizarre,  She went to [facility] for 2 weeks and was asked to leave the tour due to her bizarre and intrusive Yazidism behavior , she was unable to fly home alone due to her behavior and left the airport on her own and was wandering around homeless , gave all her money away, no answering her phone and not using her credit care.  had to fly her home with his assistance. PCP and  have also been concerned about  her behavior. Mental status assessment: She is alert , oriented x's 3 , pleasant, cooperative and needs help with her treatment     Strengths: supportive family -     Collateral information:     Current psychiatric /substance abuse providers and contact info:     Previous psychiatric/substance abuse providers and response to treatment:     Family history of mental illness or substance abuse: no     Substance abuse history:    Social History     Tobacco Use    Smoking status: Never Smoker    Smokeless tobacco: Never Used   Substance Use Topics    Alcohol use: Yes     Comment: socially       History of biomedical complications associated with substance abuse :none noted     Patient's current acceptance of treatment or motivation for change:    Family constellation:  17 years- no children     Is significant other involved?        Describe support system:     Describe living arrangements and home environment:lives at home with her      Health issues:   Hospital Problems  Date Reviewed: 11/4/2019          Codes Class Noted POA    Bipolar affective disorder, current episode manic ([facility].) ICD-10-CM: F31.10  ICD-9-CM: 296.40 2019 Unknown              Trauma history: none noted     Legal issues: no    History of  service: no    Financial status:unemployed      Judaism/cultural factors: Temple   Education/work history:     Have you been licensed as a health care professional (current or ): yes , 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

## 2019-11-05 NOTE — ED NOTES
Verbal shift change report given to Saurabh DIAZ RN  (oncoming nurse) by Eleazar Tobar RN (offgoing nurse). Report included the following information SBAR and ED Summary.

## 2019-11-05 NOTE — INTERDISCIPLINARY ROUNDS
Behavioral Health Interdisciplinary Rounds Patient Name: Noa Torre  Age: 63 y.o. Room/Bed:  736/ Primary Diagnosis: <principal problem not specified> Admission Status: TDO Readmission within 30 days: no 
Power of  in place: no 
Patient requires a blocked bed: yes          Reason for blocked bed: behavior VTE Prophylaxis: No 
 
Mobility needs/Fall risk: no 
Flu Vaccine : no  
Nutritional Plan: no 
Consults:         
Labs/Testing due today?: yes Sleep hours: 2.25 Participation in Care/Groups:   
Medication Compliant?:  
PRNS (last 24 hours):    
Restraints (last 24 hours):  no 
  
CIWA (range last 24 hours): COWS (range last 24 hours): Alcohol screening (AUDIT) completed -   AUDIT Score: 3 If applicable, date SBIRT discussed in treatment team AND documented:  
AUDIT Screen Score: AUDIT Score: 3 Tobacco - patient is a smoker: Have You Used Tobacco in the Past 30 Days: No 
Illegal Drugs use: Have You Used Any Illegal Substances Over the Past 12 Months: No 
 
24 hour chart check complete: yes Patient goal(s) for today:  
Treatment team focus/goals: Plan to set up his hearing and assess for medications and discharge needs. Progress note : She was very religiously preoccupied and does not fell she needs to be here or take medications. LOS:  1  Expected LOS: TBD Financial concerns/prescription coverage:  [employer] Family contact: Chio Gage,  [phone] Family requesting physician contact today:   
Discharge plan: She will return home with her . Access to weapons : no Outpatient provider(s): TBD Patient's preferred phone number for follow up call :  
 
Participating treatment team members: Silvino Long RN - Reese Calhoun NP

## 2019-11-05 NOTE — BH NOTES
TRANSFER - IN REPORT:    Verbal report received from [facility] on [address]  being received from [address] [place] [address] for routine progression of care      Report consisted of patients Situation, Background, Assessment and   Recommendations(SBAR). Information from the following report(s) SBAR was reviewed with the receiving nurse. Opportunity for questions and clarification was provided. Assessment completed upon patients arrival to unit and care assumed.

## 2019-11-05 NOTE — PROGRESS NOTES
Problem: Psychosis  Goal: *STG: Remains safe in hospital  Outcome: Progressing Towards Goal  Note:   Out in milieu for short period during meal time. Preoccupied and bizarre at times. Shortly returns back to room. Staff will continue to monitor q 15 min checks.

## 2019-11-05 NOTE — PROGRESS NOTES
Blocked Bed Documentation:    Room number: 216  Type: Behavior  Rationale: Impulsive  Anticipated duration: reassess every shift  Additional comments:    Problem: Psychosis  Goal: *STG: Remains safe in hospital  Outcome: Progressing Towards Goal  Note:   Pt receiving continuous q15m safety checks, pt currently asleep resting comfortably in bed. No distress noted, respiratory WNL. Supplemental lighting utilized.

## 2019-11-05 NOTE — PROGRESS NOTES
[address] [facility]  Master Treatment Plan for [id number] [address]    Date Treatment Plan Initiated: 11/05/2019    Treatment Plan Modalities:  Type of Modality Amount  (x minutes) Frequency (x/week) Duration (x days) Name of Responsible Staff   Community & wrap-up meetings to encourage peer interactions 15 7 1 Rich CHO     Group psychotherapy to assist in building coping skills and internal controls 60 7 1 Steve Payan   Therapeutic activity groups to build coping skills 60 7 1 Steve Payan   Psychoeducation in group setting to address:   Medication education   [facility]. RN   Coping skills         Relaxation techniques         Symptom management         Discharge planning   60 2 [address]   Spirituality    61 2 1 lain Clent Cheadle NAMI   61 1 1 volunteer   Recovery/AA/NA      volunteer   Physician medication management   13 7 1 Dr. Jake Carlos meeting/discharge planning   15 2 [facility] and Rosa Philippe                                  Goals will be met by 11/12/2019    Problem: Psychosis  Goal: *STG: Decreased hallucinations  Outcome: Not Progressing Towards Goal  Note:   Patient is participatory in treatment team. Is religiously preoccupied/delusional. States that she doesn't understand why she is here. Believes that God is with her always and that she has many children that God gave her, although not biologically. Stated that while in [place] [facility] she was on \"a pilgrimage just as [place] was. It's hard to explain the journey\". Also stated that she will not take any medication (unless approved by her ) as she doesn't seem to see why she needs any. Otherwise, she is calm and cooperative on the unit. Denies SI/HI. Goal: *STG: Decreased delusional thinking  Outcome: Not Progressing Towards Goal  Note:   Delusional. Focused on her bible and Religion teachings.  Religiously preoccupied  Goal: *STG: Remains safe in hospital  Outcome: Progressing Towards Goal  Note:   Safe  Goal: *STG/LTG: Complies with medication therapy  Outcome: Progressing Towards Goal  Note:   Compliant  Goal: *STG/LTG: Demonstrates improved thought patterns as evidenced by logical and coherent speech  Outcome: Not Progressing Towards Goal  Note:   Does not demonstrate improved thought patterns  Goal: Interventions  Outcome: Progressing Towards Goal

## 2019-11-05 NOTE — BSMART NOTE
Patient has been determined to be a TDO per THE [facility]. Patient has been accepted as TDO to [facility] acute. Selena Parks 991

## 2019-11-05 NOTE — BH NOTES
GROUP THERAPY PROGRESS NOTE    [id number] [facility] Ernestina partially participated in an Acute Unit Process Group, with a focus on identifying feelings, planning for the rest of the day, and developing coping skills. Group time: 1993  8650. Personal goal for participation: To increase the capacity to improve ones mood and structure. Goal orientation: The patient will be able to identify their feelings, develop a plan for structuring their day, and practicing appropriate interaction with peers. Therapeutic interventions reviewed and discussed: The group members were asked to introduce themselves to each other and to see if they share their feelings and thoughts, and plans for the rest of their day. Impression of participation: With prompting, this patient marginally and passively participated in the group. She joined the group about five minutes after the start, was alert, and generally oriented. She expressed no current or active SI/HI. She may have been responding to internal stimuli and/or possible delusions of persecution. At one point she came right out and said she felt \"persecuted,\" but admitted that she could not put the words together to explain herself. She initially said she was feeling, \"Good. \" Her thinking was not easy to follow and sounded confused. She looked very thin, almost emaciated. She said she came into the hospital, \"because my father told me to. Zakia Mcfadden I do what he says. \" She also spoke about going home and returning to her parish, which she appeared to have some ambivalence about. Her affect was labile, mixed with anxiety, fear, paranoia, and mild euphoria. Her mood reflected her affect. This was the patient's first process group with the undersigned.

## 2019-11-06 LAB
CHOLEST SERPL-MCNC: 159 MG/DL
EST. AVERAGE GLUCOSE BLD GHB EST-MCNC: 114 MG/DL
HBA1C MFR BLD: 5.6 % (ref 4.2–6.3)
HDLC SERPL-MCNC: 51 MG/DL
HDLC SERPL: 3.1 {RATIO} (ref 0–5)
LDLC SERPL CALC-MCNC: 93 MG/DL (ref 0–100)
LIPID PROFILE,FLP: NORMAL
PROLACTIN SERPL-MCNC: 9.6 NG/ML
TRIGL SERPL-MCNC: 75 MG/DL (ref ?–150)
VLDLC SERPL CALC-MCNC: 15 MG/DL

## 2019-11-06 PROCEDURE — 82024 ASSAY OF ACTH: CPT

## 2019-11-06 PROCEDURE — 65220000003 HC RM SEMIPRIVATE PSYCH

## 2019-11-06 PROCEDURE — 83036 HEMOGLOBIN GLYCOSYLATED A1C: CPT

## 2019-11-06 PROCEDURE — 36415 COLL VENOUS BLD VENIPUNCTURE: CPT

## 2019-11-06 PROCEDURE — 80061 LIPID PANEL: CPT

## 2019-11-06 PROCEDURE — 84146 ASSAY OF PROLACTIN: CPT

## 2019-11-06 RX ORDER — ARIPIPRAZOLE 5 MG/1
5 TABLET ORAL DAILY
Status: DISCONTINUED | OUTPATIENT
Start: 2019-11-07 | End: 2019-11-08 | Stop reason: HOSPADM

## 2019-11-06 NOTE — PROGRESS NOTES
Problem: Psychosis  Goal: *STG: Remains safe in hospital  Outcome: Progressing Towards Goal  Note:   Out in milieu interacting with select peers. Smiling occasionally. Remains religiously preoccupied. Continues to politely refuse scheduled medications or MRI testing at this time. Pt did consume dinner. Remains 1:1 with staff.

## 2019-11-06 NOTE — PROGRESS NOTES
2305: Patient resting quietly in bed with eyes closed. No distress noted. Respirations are even and unlabored. Staff will continue to monitor q15 throughout the shift. Problem: Falls - Risk of  Goal: *Absence of Falls  Description  Document [facility] Fall Risk and appropriate interventions in the flowsheet.   Outcome: Progressing Towards Goal  Note:   Fall Risk Interventions:                              Blocked Bed Documentation:    Room number: 736  Type: Behavior  Rationale: Intrusive  Anticipated duration: TBD  Additional comments:

## 2019-11-06 NOTE — BH NOTES
[address] [facility]      Name:  Raul Madrid  MR#:  [id number]  :  1960  ACCOUNT #:  [de-identified]  ADMIT DATE:  2019      INITIAL PSYCHIATRIC EVALUATION    DATE OF SERVICE:  2019    CHIEF COMPLAINT:  \"I don't know why I'm here. \"    HISTORY OF PRESENT ILLNESS:  The patient is a 60-year-old female who is currently admitted at [address] [place] [address] on a temporary nursing home order basis. The patient has no prior psychiatry history, no history of mental illness. She is currently being followed by Dr. Hieu Huffman, her internal medicine doctor, and was diagnosed with pituitary adenoma. She saw her primary care physician yesterday. She was brought in by her  and her  due to increasing agitation and bizarre behaviors. The patient went on a pilgrimage to [facility] two weeks ago with her [employer] group. They went to [facility] and the patient was noted to be yelling at random people and not following the group instructions. Her [employer] group was very concerned and had people watching her in her hotel room until when they attempted to get her on a flight home. She was kicked off the plane, and they tried to schedule her flight with another airline, and she was escorted off it as well. She had to wait in the airport for her  to fly there to pick her up, but when her  arrived, she was missing. A missing person was filed and she was found after two-and-a-half days wandering around the streets. Her  was able to bring her back home, but she was talking about seeing angels and yelling at people and had to have her seats moved. She was also yelling at people on the shuttle. She has been religiously preoccupied. She states  that she stays up at night praying and prays most of the day. Her  reported that the patient has barely been sleeping. The patient is asking for her passport because she wants to travel again.   When her  found her, she was very confused and disoriented. She states that her  has taken her passport, and there is nothing wrong with her. During the interview, she was religiously preoccupied. She states that if God calls her to sleep, she will sleep like a baby. States that people are ignorant. She states that she has a lot of children, all children of God are her children. She also does not think she needs medications. All she needs is the blood and body of Pete.  Her thought process is very loose and disorganized. She is very acutely psychotic. Her speech is pressured. She was admitted to the Inpatient Psychiatric Unit for further evaluation and treatment. PAST MEDICAL HISTORY:   See H and P. PAST PSYCHIATRIC HOSPITALIZATION:  The patient has no prior psychiatric history or mental illness. This is her first psychiatric inpatient admission. PSYCHOSOCIAL HISTORY:  She has been  for 17 years. She has no children of her own. She is a housewife. MENTAL STATUS EXAMINATION:  She is alert and oriented in all spheres. She reports her mood is dysphoric. Affect is reactive. Speech is pressured. Thought process is loose and disorganized. Grandeur delusions are noted. Hallucinations are evident. Memory seems intact. Intelligence seems average. Insight is poor. Judgment is poor. DIAGNOSIS:  Unspecified psychotic disorder. Rule out schizophrenia. TREATMENT PLANNING:  I will continue her inpatient stay. She will be provided with support and encouraged to attend groups. Her safety will be monitored. Her medications will be modified and assessed. Case Management will work on discharge planning. ASSETS AND STRENGTHS:  She is willing to seek help. She is willing to take medications. ESTIMATED LENGTH OF STAY:  5-7 days.       GENNA PRASAD NP      [id number]  D:  11/05/2019 14:46  T:  11/05/2019 22:19  JOB #:  [id number]

## 2019-11-06 NOTE — BH NOTES
Received telephone call from pt spouse stating that 2 priests will come tomorrow to see pt. Father Parth Yadav will come between 11a -12p tomorrow.

## 2019-11-06 NOTE — BH NOTES
Blocked Bed Documentation:    Room number: 411-51  Type: Behavior  Rationale: Impulsive  Anticipated duration: TBD  Additional comments:Religiously delusional, agitation.

## 2019-11-06 NOTE — PROGRESS NOTES
Laboratory Monitoring for Antipsychotics: This patient is currently prescribed the following medication(s):   Current Facility-Administered Medications   Medication Dose Route Frequency    [START ON 11/7/2019] ARIPiprazole (ABILIFY) tablet 5 mg  5 mg Oral DAILY    traZODone (DESYREL) tablet 50 mg  50 mg Oral QHS     The following labs have been completed for monitoring of antipsychotics and/or mood stabilizers:    Height, Weight, BMI Estimation  Estimated body mass index is 18.48 kg/m² as calculated from the following:    Height as of an earlier encounter on 11/4/19: 170.2 cm (67\"). Weight as of this encounter: 53.5 kg (118 lb). Vital Signs/Blood Pressure  Visit Vitals  /84 (BP 1 Location: Left arm, BP Patient Position: Sitting)   Pulse 76   Temp 97.3 °F (36.3 °C)   Resp 16   Wt 53.5 kg (118 lb)   SpO2 96%   BMI 18.48 kg/m²     Renal Function, Hepatic Function and Chemistry  Estimated Creatinine Clearance: 66.4 mL/min (based on SCr of 0.78 mg/dL). Lab Results   Component Value Date/Time    Sodium 141 11/04/2019 04:02 PM    Potassium 3.2 (L) 11/04/2019 04:02 PM    Chloride 105 11/04/2019 04:02 PM    CO2 30 11/04/2019 04:02 PM    Anion gap 6 11/04/2019 04:02 PM    BUN 10 11/04/2019 04:02 PM    Creatinine 0.78 11/04/2019 04:02 PM    BUN/Creatinine ratio 13 11/04/2019 04:02 PM    Bilirubin, total 0.3 11/04/2019 04:02 PM    Protein, total 7.4 11/04/2019 04:02 PM    Albumin 4.0 11/04/2019 04:02 PM    Globulin 3.4 11/04/2019 04:02 PM    A-G Ratio 1.2 11/04/2019 04:02 PM    ALT (SGPT) 26 11/04/2019 04:02 PM    Alk.  phosphatase 91 11/04/2019 04:02 PM     Lab Results   Component Value Date/Time    Glucose 94 11/04/2019 04:02 PM     Lab Results   Component Value Date/Time    Hemoglobin A1c 5.6 11/06/2019 05:35 AM       Hematology  Lab Results   Component Value Date/Time    WBC 5.5 11/04/2019 04:02 PM    RBC 4.56 11/04/2019 04:02 PM    HGB 13.5 11/04/2019 04:02 PM    HCT 42.5 11/04/2019 04:02 PM    MCV 93.2 11/04/2019 04:02 PM    MCH 29.6 11/04/2019 04:02 PM    MCHC 31.8 11/04/2019 04:02 PM    RDW 12.0 11/04/2019 04:02 PM    PLATELET 988 80/01/6095 04:02 PM     Lipids  Lab Results   Component Value Date/Time    Cholesterol, total 159 11/06/2019 05:35 AM    HDL Cholesterol 51 11/06/2019 05:35 AM    LDL, calculated 93 11/06/2019 05:35 AM    Triglyceride 75 11/06/2019 05:35 AM    CHOL/HDL Ratio 3.1 11/06/2019 05:35 AM     Thyroid Function  Lab Results   Component Value Date/Time    TSH 1.36 11/05/2019 06:30 AM    T4, Free 1.18 01/08/2018 12:00 AM    T4, Total 7.6 09/24/2009 08:12 AM     Prolactin Level = 9.6 ng/mL (11/6/2019)    Pregnancy Status  No results found for: Jeimy Farfan [id number], [id number], [id number], PREGU, POCHCG, MHCGN, HCGQR, THCGA1, SHCG, HCGN, HCGURQLPOC    Assessment/Plan:  Recommended baseline laboratory monitoring has been completed based on this patient's current medication regimen. The patient's estimated 10-year ASCVD risk is 3.0%; therefore, the patient is not a candidate for a high-intensity statin. No further intervention needed at this time. Follow-up metabolic monitoring labs should be completed in 3 months (quarterly for the first year of antipsychotic therapy).      Barbie Hampton, LAURELD

## 2019-11-06 NOTE — BH NOTES
GROUP THERAPY PROGRESS NOTE    Asael Donis is participating in [place]. Group time: 15 minutes    Personal goal for participation: To reach gods purpose. Goal orientation: personal    Group therapy participation: active    Therapeutic interventions reviewed and discussed: Writer listened attentively and explained the unit routine. Impression of participation: Patient participated actively.

## 2019-11-06 NOTE — GROUP NOTE
[facility]  GROUP DOCUMENTATION INDIVIDUAL Group Therapy Note Date: November 5 Group Start Time: 2025 Group End Time: 2045 Group Topic: Reflection/Relaxation Issac Joiner Nurse, Angelita OCONNOR  GROUP DOCUMENTATION GROUP Group Therapy Note Attendance: Did not attend Beryle Sinner

## 2019-11-06 NOTE — BH NOTES
Blocked Bed Documentation:     Room number: 367-29  Type: Behavior  Rationale: Impulsive  Anticipated duration: TBD  Additional comments:

## 2019-11-06 NOTE — PROGRESS NOTES
Problem: Psychosis  Goal: *STG: Decreased delusional thinking  11/6/2019 0748 by Radha Cuevas RN  Outcome: Not Progressing Towards Goal  Note:   Patient is participatory in treatment team. Remains religiously preoccupied and delusional. Experiences spiritual moments in which she quickly lashes out as if being embodied \"by Ramila Hull". Continues to be adamant on taking any medication until she talks to her  about it. She was informed that an MRI would be ordered for ruling out any other issues, she then stated \"God believes me to be okay, so I don't want an MRI\".    11/6/2019 0745 by Radha Cuevas RN  Outcome: Not Progressing Towards Goal  Goal: *STG: Remains safe in hospital  Outcome: Progressing Towards Goal  Note:   Safe  Goal: *STG/LTG: Complies with medication therapy  Outcome: Not Progressing Towards Goal  Note:   Refuses to take medication unless approved by .  Goal: Interventions  Outcome: Progressing Towards Goal

## 2019-11-06 NOTE — BH NOTES
Staff informs pt about scheduled MRI ordered by NP. Staff attempts to educate pt about importance of having MRI done. Pt declines screening and MRI test at this time.

## 2019-11-06 NOTE — INTERDISCIPLINARY ROUNDS
Behavioral Health Interdisciplinary Rounds Patient Name: Jose De Jesus Ford  Age: 63 y.o. Room/Bed:  736/ Primary Diagnosis: <principal problem not specified> Admission Status: TDO Readmission within 30 days: no 
Power of  in place: no 
Patient requires a blocked bed: yes          Reason for blocked bed: behavior VTE Prophylaxis: No 
 
Mobility needs/Fall risk: no 
Flu Vaccine : no  
Nutritional Plan: no 
Consults:         
Labs/Testing due today?: yes-complete Sleep hours:  6.5 Participation in Care/Groups:  no 
Medication Compliant?: Selective PRNS (last 24 hours): None Restraints (last 24 hours):  no 
  
CIWA (range last 24 hours): COWS (range last 24 hours): Alcohol screening (AUDIT) completed -   AUDIT Score: 3 If applicable, date SBIRT discussed in treatment team AND documented:  
AUDIT Screen Score: AUDIT Score: 3 Document Brief Intervention (corresponds directly with the 5 A's, Ask, Advise, Assess, Assist, and Arrange): At- Risk Patients (Score 7-15 for women; 8-15 for men) Discuss concern patient is drinking at unhealthy levels known to increase risk of alcohol-related health problems. Is Patient ready to commit to change? If No: 
? Encourage reflection ? Discuss short term and long term health risks of consuming alcohol ? Barriers to change ? Reaffirm willingness to help / Educational materials provided If Yes: 
? Set goal 
? Plan 
? Educational materials provided Harmful use or Dependence (Score 16 or greater) ? Discuss short term and long term health risks of consuming alcohol ? Recommendations ? Negotiate drinking goal 
? Recommend addiction specialist/center ? Arrange follow-up appointments. Tobacco - patient is a smoker: Have You Used Tobacco in the Past 30 Days: No 
Illegal Drugs use: Have You Used Any Illegal Substances Over the Past 12 Months: No 
 
24 hour chart check complete: yes Patient goal(s) for today: Treatment team focus/goals:  
Progress note LOS:  2  Expected LOS:  
 
Financial concerns/prescription coverage:   
Family contact:      
Family requesting physician contact today:   
Discharge plan: Access to weapons :        
Outpatient provider(s):  
Patient's preferred phone number for follow up call :  
 
Participating treatment team members: Tisha Reeder, * (assigned SW),

## 2019-11-06 NOTE — BH NOTES
GROUP THERAPY PROGRESS NOTE    Beatrice Domínguez participated in an Acute Unit Process Group, with a focus on identifying feelings, planning for the rest of the day, and developing coping skills. Group time: 2564  8322. Personal goal for participation: To increase the capacity to improve ones mood and structure. Goal orientation: The patient will be able to identify their feelings, develop a plan for structuring their day, and practicing appropriate interaction with peers. Therapeutic interventions reviewed and discussed: The group members were asked to introduce themselves to each other and to see if they share their feelings and thoughts, and plans for the rest of their day. Impression of participation: With prompting, this patient participated in the group. She was alert, very generally oriented, and said she \"thought about going home this morning,\" which may have been a reference to her jail hearing where she was committed. She said she liked to spend her time \"reading and in prayer. \" She carried a brightly colored book with \"Prince\" in the title. She added that she has been waking up at 2 and 3 AM, but denied that this was a problem for her. The patient expressed no current SI/HI and may have been responding to internal stimuli or Rastafarian delusions, in short, she remained religiously preoccupied. Her affect was restricted and her mood was relatively calm. She may benefit from reality therapy, without directly challenging her Rastafarian delusions until after an appropriate medication regime is attempted and if her thinking is conflicting with her ability to reach the goals she wants for her life.

## 2019-11-07 LAB — ACTH PLAS-MCNC: 11 PG/ML (ref 7.2–63.3)

## 2019-11-07 PROCEDURE — 65220000003 HC RM SEMIPRIVATE PSYCH

## 2019-11-07 NOTE — BH NOTES
GROUP THERAPY PROGRESS NOTE    [id number] [facility] Ernestina partially participated in an Acute Unit Process Group, with a focus on identifying feelings, planning for the rest of the day, and developing coping skills. Group time: 4278  2702. Personal goal for participation: To increase the capacity to improve ones mood and structure. Goal orientation: The patient will be able to identify their feelings, develop a plan for structuring their day, and practicing appropriate interaction with peers. Therapeutic interventions reviewed and discussed: The group members were asked to introduce themselves to each other and to see if they share their feelings and thoughts, and plans for the rest of their day. Impression of participation: With prompting, this patient marginally participated in the group. She was alert, generally oriented, went into, back out, and returned to the group within the first fifteen minutes of the group. She expressed no current SI/HI and may have been responding to internal stimuli and delusions. She certainly remained religiously preoccupied. She said, for example, that she felt \"good\" and her goal for the day was \"to pray more and more. \" She brought a small devotional book with her to the group and was intent on reading a brief passage, mostly a collection of Biblical verses out of the studdex. Her affect was anxious and frightened. Her mood reflected her affect. She added that she enjoyed participating in adding pictures to the tree of animals in the dining area. She made an effort to interact with her peers on a mostly superficial level, even though a couple of peers were curious about the devotional book the patient seems to have with her in most process groups.

## 2019-11-07 NOTE — INTERDISCIPLINARY ROUNDS
Behavioral Health Interdisciplinary Rounds Patient Name: Sancho Salazar  Age: 63 y.o. Room/Bed:  736/ Primary Diagnosis: <principal problem not specified> Admission Status: TDO Readmission within 30 days: no 
Power of  in place: no 
Patient requires a blocked bed: yes          Reason for blocked bed: behavior VTE Prophylaxis: No 
 
Mobility needs/Fall risk: no 
Flu Vaccine : no  
Nutritional Plan: no 
Consults:         
Labs/Testing due today?: yes Sleep hours: 4.50 Participation in Care/Groups:  no 
Medication Compliant?: Selective PRNS (last 24 hours): None Restraints (last 24 hours):  no 
  
CIWA (range last 24 hours): COWS (range last 24 hours): Alcohol screening (AUDIT) completed -   AUDIT Score: 3 If applicable, date SBIRT discussed in treatment team AND documented:  
AUDIT Screen Score: AUDIT Score: 3 Tobacco - patient is a smoker: Have You Used Tobacco in the Past 30 Days: No 
Illegal Drugs use: Have You Used Any Illegal Substances Over the Past 12 Months: No 
 
24 hour chart check complete: yes Patient goal(s) for today:  
Treatment team focus/goals: Plan on forced medication hearing. Progress note: She refused medications, lab work and the MRI. She has been pleasant with her peers. \" what I need comes from the \" LOS:  3  Expected LOS:TBD Financial concerns/prescription coverage:  [employer] Family contact:  Aristeo Khan,  [phone] Family requesting physician contact today:   
Discharge plan: She will return home with her  Access to weapons :  no Outpatient provider(s): TBD Patient's preferred phone number for follow up call : 
 
Participating treatment team members: Nathaniel Mariee, CHET Miles,  PharmD.

## 2019-11-07 NOTE — PROGRESS NOTES
Problem: Discharge Planning  Goal: *Discharge to safe environment  Outcome: Progressing Towards Goal  Note:   She will return home with her       Problem: Discharge Planning  Goal: *Knowledge of medication management  Outcome: Not Progressing Towards Goal  Note:   She is refusing medications   Goal: *Knowledge of discharge instructions  Outcome: Not Progressing Towards Goal  Note:   At this time she is refusing psych follow up

## 2019-11-07 NOTE — BH NOTES
Pt is declining 24 hr urine at this time. Pt is stating she will consult with her [facility] tomorrow.

## 2019-11-07 NOTE — PROGRESS NOTES
In bed asleep with respirations noted as even and unlabored as chest was rising and falling. Will continue to monitor for safety and 15 minute checks throughout shift. Basilio Martinez

## 2019-11-07 NOTE — PROGRESS NOTES
PSYCHIATRIC PROGRESS NOTE       Patient: Noa Torre MRN: [id number]  SSN: [id number]    YOB: 1960  Age: 63 y.o. Sex: female      Admit Date: 11/4/2019    LOS: 3 days       Chief Complaint:  I need to pray all the time. Interval History:  Noa Torre is not making progress, she is profoundly preoccupied with Orthodoxy themes, about Jainism, holy spirit, her . Marisol Silva am what I am by the vik of God, what I need is only my .\" \"God calls me to the life of prayer. \" She believes she is not mentally ill, she is here in the hospital \"under observation. \" She is refusing abilify, she also refused MRI and other diagnostic tests. \"I dont need medication, the holy spirit will be terrified. \" States she is a  and her medical issues have been stable for years. Slept over 4 hours. Her thought process is disorganized, insight is very poor. We will petition for court ordered medication. Past Medical History:  History reviewed. No pertinent past medical history.       ALLERGIES:(reviewed/updated 11/7/2019)  Allergies   Allergen Reactions    Contrast Agent [Iodine] Rash    Penicillin G Other (comments)     In childhood reaction unknown       Laboratory report:  Lab Results   Component Value Date/Time    WBC 5.5 11/04/2019 04:02 PM    HGB 13.5 11/04/2019 04:02 PM    HCT 42.5 11/04/2019 04:02 PM    PLATELET 492 97/99/8996 04:02 PM    MCV 93.2 11/04/2019 04:02 PM      Lab Results   Component Value Date/Time    Sodium 141 11/04/2019 04:02 PM    Potassium 3.2 (L) 11/04/2019 04:02 PM    Chloride 105 11/04/2019 04:02 PM    CO2 30 11/04/2019 04:02 PM    Anion gap 6 11/04/2019 04:02 PM    Glucose 94 11/04/2019 04:02 PM    BUN 10 11/04/2019 04:02 PM    Creatinine 0.78 11/04/2019 04:02 PM    BUN/Creatinine ratio 13 11/04/2019 04:02 PM    GFR est AA >60 11/04/2019 04:02 PM    GFR est non-AA >60 11/04/2019 04:02 PM    Calcium 8.9 11/04/2019 04:02 PM    Bilirubin, total 0.3 11/04/2019 04:02 PM AST (SGOT) 15 11/04/2019 04:02 PM    Alk. phosphatase 91 11/04/2019 04:02 PM    Protein, total 7.4 11/04/2019 04:02 PM    Albumin 4.0 11/04/2019 04:02 PM    Globulin 3.4 11/04/2019 04:02 PM    A-G Ratio 1.2 11/04/2019 04:02 PM    ALT (SGPT) 26 11/04/2019 04:02 PM      Vitals:    11/06/19 0813 11/06/19 1600 11/06/19 1928 11/07/19 0746   BP: 145/84 119/82 118/79 122/80   Pulse: 76 78 80 90   Resp: 16 16 16 16   Temp: 97.3 °F (36.3 °C) 97.8 °F (36.6 °C) 98.2 °F (36.8 °C) 97.9 °F (36.6 °C)   SpO2:  99% 98%    Weight:           No results found for: VALF2, VALAC, VALP, VALPR, DS6, CRBAM, CRBAMP, CARB2, XCRBAM  No results found for: LITHM    Vital Signs  Patient Vitals for the past 24 hrs:   Temp Pulse Resp BP SpO2   11/07/19 0746 97.9 °F (36.6 °C) 90 16 122/80    11/06/19 1928 98.2 °F (36.8 °C) 80 16 118/79 98 %   11/06/19 1600 97.8 °F (36.6 °C) 78 16 119/82 99 %     Wt Readings from Last 3 Encounters:   11/05/19 53.5 kg (118 lb)   03/13/19 57.2 kg (126 lb)   06/20/18 56.7 kg (125 lb)     Temp Readings from Last 3 Encounters:   11/07/19 97.9 °F (36.6 °C)   11/04/19 98.3 °F (36.8 °C) (Oral)   03/13/19 98.2 °F (36.8 °C) (Oral)     BP Readings from Last 3 Encounters:   11/07/19 122/80   11/04/19 (!) 150/103   03/13/19 123/77     Pulse Readings from Last 3 Encounters:   11/07/19 90   11/04/19 88   03/13/19 63       Radiology (reviewed/updated 11/7/2019)  Ct Head Wo Cont    Result Date: 11/4/2019  EXAM: CT HEAD WO CONT INDICATION: confusion, berta COMPARISON: 2013 MR. CONTRAST: None. TECHNIQUE: Unenhanced CT of the head was performed using 5 mm images. Brain and bone windows were generated. CT dose reduction was achieved through use of a standardized protocol tailored for this examination and automatic exposure control for dose modulation. FINDINGS: The ventricles and sulci are normal in size, shape and configuration and midline. There is no significant white matter disease.  There is no intracranial hemorrhage, extra-axial collection, mass, mass effect or midline shift. The basilar cisterns are open. No acute infarct is identified. The bone windows demonstrate no abnormalities. The visualized portions of the paranasal sinuses and mastoid air cells are clear. IMPRESSION: No acute process identified       Side Effects: (reviewed/updated 11/7/2019)  None reported or admitted to. Review of Systems: (reviewed/updated 11/7/2019)  Appetite: good  Sleep: good   All other Review of Systems: religiously preoccupied    Mental Status Exam:  Eye contact: Good eye contact  Psychomotor activity: Anxious  Speech is spontaneous  Thought process: loose disorganized  Mood is \"ok\"  Affect: Full   Perception: No avh  Thought content: +paranoa/delusions, religiously preoccupied  Suicidal ideation: No si  Homicidal ideation: No hi  Insight/judgment: Poor  Cognition is grossly intact      Physical Exam:  Musculoskeletal system: steady gait  Tremor not present  Cog wheeling not present      Assessment and Plan:  Beatrice Domínguez meets criteria for a diagnosis of unspecified psychotic disorder. MRI of the brain, 24 hour urine cortisol. Start abilify 5mg po daily. Petition for court ordered medication. Continue current medications as prescribed. We will closely monitor for safety. We will encourage reality orientation. Disposition planning to continue. I certify that this patients inpatient psychiatric hospital services furnished since the previous certification were, and continue to be, required for treatment that could reasonably be expected to improve the patient's condition, or for diagnostic study, and that the patient continues to need, on a daily basis, active treatment furnished directly by or requiring the supervision of inpatient psychiatric facility personnel. In addition, the hospital records show that services furnished were intensive treatment services, admission or related services, or equivalent services. Signed:  Shawn Neal NP  11/7/2019

## 2019-11-07 NOTE — BH NOTES
Second Opinion (regarding): Capacity to refuse medications    Reason for Admission:  Justine Lomeli was admitted for severe psychosis and berta posing a risk of harm to herself and others. Diagnosis: Unspecified Psychosis    The patient does not recognize or acknowledge that she has a mental illness requiring medications to stabilize her mental status. The patient does not recognize the dangers of not taking the recommended psychiatric medications. She continues to refuse medications. Determination: Based on my independent evaluation of the patient on 11/7/2019, the patient does not have the capacity to refuse the psychiatric medications currently recommended for her illness. Recommendation: Referral to [facility] for Treatment Over Objection order. The patient does not have a surrogate decision maker or guardian to make these decisions on her behalf.       Signed By:   Deonte Garduno MD  11/7/2019

## 2019-11-07 NOTE — BH NOTES
Blocked Bed Documentation:    Room number: 824  Type: Behavior  Rationale: Impulsive  Anticipated duration: TBD

## 2019-11-07 NOTE — PROGRESS NOTES
Goal: *STG: Patient will verbalize areas in need of boundary recognition and limit setting  Outcome: Progressing Towards Goal    Goal: *STG/LTG: Complies with medication therapy  Outcome: Progressing Towards Goal    Problem: Psychosis  Goal: *STG: Decreased delusional thinking  Outcome: Progressing Towards Goal  Note:   0730 Pt resting quietly in bed. No distress noted. Will monitor with Q 15 safety checks. Pt refused scheduled Abilify, stating, \"I don't want to mess up what God has done. \"   Pt is smiling, pleasant, and calm. 1000 Pt ambivalent about MRI but ultimately refused.

## 2019-11-08 ENCOUNTER — APPOINTMENT (OUTPATIENT)
Dept: MRI IMAGING | Age: 59
DRG: 885 | End: 2019-11-08
Attending: NURSE PRACTITIONER
Payer: COMMERCIAL

## 2019-11-08 VITALS
TEMPERATURE: 98.2 F | BODY MASS INDEX: 18.48 KG/M2 | WEIGHT: 118 LBS | SYSTOLIC BLOOD PRESSURE: 119 MMHG | OXYGEN SATURATION: 99 % | RESPIRATION RATE: 16 BRPM | DIASTOLIC BLOOD PRESSURE: 82 MMHG | HEART RATE: 87 BPM

## 2019-11-08 PROCEDURE — A9585 GADOBUTROL INJECTION: HCPCS | Performed by: PSYCHIATRY & NEUROLOGY

## 2019-11-08 PROCEDURE — 74011000258 HC RX REV CODE- 258: Performed by: PSYCHIATRY & NEUROLOGY

## 2019-11-08 PROCEDURE — 74011250636 HC RX REV CODE- 250/636: Performed by: PSYCHIATRY & NEUROLOGY

## 2019-11-08 PROCEDURE — 77030021566 MRI BRAIN W WO CONT

## 2019-11-08 RX ADMIN — GADOBUTROL 5.5 ML: 604.72 INJECTION INTRAVENOUS at 14:59

## 2019-11-08 RX ADMIN — SODIUM CHLORIDE 100 ML: 900 INJECTION, SOLUTION INTRAVENOUS at 14:59

## 2019-11-08 NOTE — INTERDISCIPLINARY ROUNDS
Behavioral Health Interdisciplinary Rounds Patient Name: Lisbet Ortez  Age: 63 y.o. Room/Bed:  [facility] Primary Diagnosis: <principal problem not specified> Admission Status: TDO Readmission within 30 days: no 
Power of  in place: no 
Patient requires a blocked bed: yes          Reason for blocked bed: behavior VTE Prophylaxis: No 
 
Mobility needs/Fall risk: no 
Flu Vaccine : no  
Nutritional Plan: no 
Consults:         
Labs/Testing due today?: no 
 
Sleep hours:       
Participation in Care/Groups:  no 
Medication Compliant?: Selective PRNS (last 24 hours): None Restraints (last 24 hours):  no 
  
CIWA (range last 24 hours): COWS (range last 24 hours): Alcohol screening (AUDIT) completed -   AUDIT Score: 3 If applicable, date SBIRT discussed in treatment team AND documented:  
AUDIT Screen Score: AUDIT Score: 3 Tobacco - patient is a smoker: Have You Used Tobacco in the Past 30 Days: No 
Illegal Drugs use: Have You Used Any Illegal Substances Over the Past 12 Months: No 
 
24 hour chart check complete: yes Patient goal(s) for today:  
Treatment team focus/goals: Discharge AMA Progress note: plan to discharge today AMA  
 
LOS:  4  Expected LOS:4 days Financial concerns/prescription coverage:   
Family contact:     
Family requesting physician contact today:   
Discharge plan:she will return home with her  Access to weapons :  no Outpatient provider(s):refered to [facility] Patient's preferred phone number for follow up call : 
 
Participating treatment team members: Lisbet Ortez,  MICAH Bray RN

## 2019-11-08 NOTE — BH NOTES
GROUP THERAPY PROGRESS NOTE    Carol Rosales is participating in [place].      Group time: 10 minutes    Personal goal for participation: pray all day    Goal orientation: community    Group therapy participation: active    Therapeutic interventions reviewed and discussed: yes    Impression of participation: engaged

## 2019-11-08 NOTE — BH NOTES
Behavioral Health Transition Record to Provider    Patient Name: Justine Lomeli  YOB: 1960  Medical Record Number: [id number]  Date of Admission: 11/4/2019  Date of Discharge: 11/8/2019     Attending Provider: Alexis Stovall MD  Discharging Provider: Dr. Arya Cai   To contact this individual call [phone] and ask the  to page. If unavailable, ask to be transferred to [facility] Provider on call. [facility] Provider will be available on call 24/7 and during holidays. Primary Care Provider: Ez Salmon MD    Allergies   Allergen Reactions    Contrast Agent [Iodine] Rash    Penicillin G Other (comments)     In childhood reaction unknown       Reason for Admission: CHIEF COMPLAINT:  \"I don't know why I'm here. \"     HISTORY OF PRESENT ILLNESS:  The patient is a 52-year-old female who is currently admitted at [address] [place] [address] on a temporary penitentiary order basis. The patient has no prior psychiatry history, no history of mental illness. She is currently being followed by Dr. Mina Galvez, her internal medicine doctor, and was diagnosed with pituitary adenoma. She saw her primary care physician yesterday. She was brought in by her  and her  due to increasing agitation and bizarre behaviors. Admission Diagnosis: Bipolar affective disorder, current episode manic [facility] [F31.10]    * No surgery found *    Results for orders placed or performed during the hospital encounter of 11/04/19   URINE CULTURE HOLD SAMPLE   Result Value Ref Range    Urine culture hold        URINE ON HOLD IN MICROBIOLOGY DEPT FOR 3 DAYS. IF UNPRESERVED URINE IS SUBMITTED, IT CANNOT BE USED FOR ADDITIONAL TESTING AFTER 24 HRS, RECOLLECTION WILL BE REQUIRED.    CBC WITH AUTOMATED DIFF   Result Value Ref Range    WBC 5.5 3.6 - 11.0 K/uL    RBC 4.56 3.80 - 5.20 M/uL    HGB 13.5 11.5 - 16.0 g/dL    HCT 42.5 35.0 - 47.0 %    MCV 93.2 80.0 - 99.0 FL    MCH 29.6 26.0 - 34.0 PG    MCHC 31.8 30.0 - 36.5 g/dL    RDW 12.0 11.5 - 14.5 %    PLATELET 111 308 - 547 K/uL    MPV 10.4 8.9 - 12.9 FL    NRBC 0.0 0  WBC    ABSOLUTE NRBC 0.00 0.00 - 0.01 K/uL    NEUTROPHILS 56 32 - 75 %    LYMPHOCYTES 34 12 - 49 %    MONOCYTES 9 5 - 13 %    EOSINOPHILS 1 0 - 7 %    BASOPHILS 0 0 - 1 %    IMMATURE GRANULOCYTES 0 0.0 - 0.5 %    ABS. NEUTROPHILS 3.0 1.8 - 8.0 K/UL    ABS. LYMPHOCYTES 1.9 0.8 - 3.5 K/UL    ABS. MONOCYTES 0.5 0.0 - 1.0 K/UL    ABS. EOSINOPHILS 0.1 0.0 - 0.4 K/UL    ABS. BASOPHILS 0.0 0.0 - 0.1 K/UL    ABS. IMM. GRANS. 0.0 0.00 - 0.04 K/UL    DF AUTOMATED     METABOLIC PANEL, COMPREHENSIVE   Result Value Ref Range    Sodium 141 136 - 145 mmol/L    Potassium 3.2 (L) 3.5 - 5.1 mmol/L    Chloride 105 97 - 108 mmol/L    CO2 30 21 - 32 mmol/L    Anion gap 6 5 - 15 mmol/L    Glucose 94 65 - 100 mg/dL    BUN 10 6 - 20 MG/DL    Creatinine 0.78 0.55 - 1.02 MG/DL    BUN/Creatinine ratio 13 12 - 20      GFR est AA >60 >60 ml/min/1.73m2    GFR est non-AA >60 >60 ml/min/1.73m2    Calcium 8.9 8.5 - 10.1 MG/DL    Bilirubin, total 0.3 0.2 - 1.0 MG/DL    ALT (SGPT) 26 12 - 78 U/L    AST (SGOT) 15 15 - 37 U/L    Alk.  phosphatase 91 45 - 117 U/L    Protein, total 7.4 6.4 - 8.2 g/dL    Albumin 4.0 3.5 - 5.0 g/dL    Globulin 3.4 2.0 - 4.0 g/dL    A-G Ratio 1.2 1.1 - 2.2     DRUG SCREEN, URINE   Result Value Ref Range    AMPHETAMINES NEGATIVE  NEG      BARBITURATES NEGATIVE  NEG      BENZODIAZEPINES NEGATIVE  NEG      COCAINE NEGATIVE  NEG      METHADONE NEGATIVE  NEG      OPIATES NEGATIVE  NEG      PCP(PHENCYCLIDINE) NEGATIVE  NEG      THC (TH-CANNABINOL) NEGATIVE  NEG      Drug screen comment (NOTE)    URINALYSIS W/MICROSCOPIC   Result Value Ref Range    Color YELLOW/STRAW      Appearance CLEAR CLEAR      Specific gravity 1.007 1.003 - 1.030      pH (UA) 7.5 5.0 - 8.0      Protein NEGATIVE  NEG mg/dL    Glucose NEGATIVE  NEG mg/dL    Ketone NEGATIVE  NEG mg/dL    Bilirubin NEGATIVE  NEG      Blood NEGATIVE  NEG      Urobilinogen 0.2 0.2 - 1.0 EU/dL    Nitrites NEGATIVE  NEG      Leukocyte Esterase TRACE (A) NEG      WBC 0-4 0 - 4 /hpf    RBC 0-5 0 - 5 /hpf    Epithelial cells FEW FEW /lpf    Bacteria NEGATIVE  NEG /hpf    Hyaline cast 0-2 0 - 5 /lpf   ETHYL ALCOHOL   Result Value Ref Range    ALCOHOL(ETHYL),SERUM <10 <10 MG/DL   ACETAMINOPHEN   Result Value Ref Range    Acetaminophen level <2 (L) 10 - 30 ug/mL   CK W/ REFLX CKMB   Result Value Ref Range    CK 72 26 - 679 U/L   SALICYLATE   Result Value Ref Range    Salicylate level <7.3 (L) 2.8 - 20.0 MG/DL   TROPONIN I   Result Value Ref Range    Troponin-I, Qt. <0.05 <0.05 ng/mL   TSH 3RD GENERATION   Result Value Ref Range    TSH 1.36 0.36 - 3.74 uIU/mL   PROLACTIN   Result Value Ref Range    Prolactin 9.6 ng/mL   LIPID PANEL   Result Value Ref Range    LIPID PROFILE          Cholesterol, total 159 <200 MG/DL    Triglyceride 75 <150 MG/DL    HDL Cholesterol 51 MG/DL    LDL, calculated 93 0 - 100 MG/DL    VLDL, calculated 15 MG/DL    CHOL/HDL Ratio 3.1 0.0 - 5.0     HEMOGLOBIN A1C WITH EAG   Result Value Ref Range    Hemoglobin A1c 5.6 4.2 - 6.3 %    Est. average glucose 114 mg/dL   PROLACTIN   Result Value Ref Range    Prolactin 9.6 ng/mL   ACTH   Result Value Ref Range    ACTH, plasma 11.0 7.2 - 63.3 pg/mL   EKG, 12 LEAD, INITIAL   Result Value Ref Range    Ventricular Rate 54 BPM    Atrial Rate 54 BPM    P-R Interval 144 ms    QRS Duration 76 ms    Q-T Interval 404 ms    QTC Calculation (Bezet) 383 ms    Calculated P Axis 79 degrees    Calculated R Axis 47 degrees    Calculated T Axis 67 degrees    Diagnosis       Sinus bradycardia  No previous ECGs available  Confirmed by Waleska Hull M.D., Salvatore Osorio ([id number]) on 11/5/2019 6:00:09 AM         Immunizations administered during this encounter:   Immunization History   Administered Date(s) Administered    Hep A Vaccine (Adult) 04/06/2016    Tdap 01/01/2010       Screening for Metabolic Disorders for Patients on Antipsychotic Medications  (Data obtained from the EMR)    Estimated Body Mass Index  Estimated body mass index is 18.48 kg/m² as calculated from the following:    Height as of an earlier encounter on 11/4/19: 5' 7\" (1.702 m). Weight as of this encounter: 53.5 kg (118 lb). Vital Signs/Blood Pressure  Visit Vitals  /82 (BP 1 Location: Left arm, BP Patient Position: Sitting)   Pulse 87   Temp 98.2 °F (36.8 °C)   Resp 16   Wt 53.5 kg (118 lb)   SpO2 99%   BMI 18.48 kg/m²       Blood Glucose/Hemoglobin A1c  Lab Results   Component Value Date/Time    Glucose 94 11/04/2019 04:02 PM       Lab Results   Component Value Date/Time    Hemoglobin A1c 5.6 11/06/2019 05:35 AM        Lipid Panel  Lab Results   Component Value Date/Time    Cholesterol, total 159 11/06/2019 05:35 AM    HDL Cholesterol 51 11/06/2019 05:35 AM    LDL, calculated 93 11/06/2019 05:35 AM    Triglyceride 75 11/06/2019 05:35 AM    CHOL/HDL Ratio 3.1 11/06/2019 05:35 AM        Discharge Diagnosis: Unspecified psychotic disorder. Rule out schizophrenia. Discharge Plan: She will be discharge [facility] with her . The patient Esmer Garcia exhibits the ability to control behavior in a less restrictive environment. Patient's level of functioning is improving. No assaultive/destructive behavior has been observed for the past 24 hours. No suicidal/homicidal threat or behavior has been observed for the past 24 hours. There is no evidence of serious medication side effects. Patient has not been in physical or protective restraints for at least the past 24 hours. If weapons involved, how are they secured? No weapons involved     Is patient aware of and in agreement with discharge plan? Patient and family are aware of discharge and are in agreement    Arrangements for medication: no prescriptions given to patient.     Copy of discharge instructions to provider?:  Yes, fax to [facility]    Arrangements for transportation home:   to      Keep all follow up appointments as scheduled, continue to take prescribed medications per physician instructions. Mental health crisis number:  [phone] or your local mental health crisis line number at [phone]     Discharge Medication List and Instructions: There are no discharge medications for this patient. Unresulted Labs (24h ago, onward)    None        To obtain results of studies pending at discharge, please contact [phone]    Follow-up Information     Follow up With Specialties Details Why Contact Info    Royal Justin MD Internal Medicine Schedule an appointment as soon as possible for a visit  [facility] [phone]      [phone]  [facility]   you can walk in for same day access Monday 8am to 2 pm Tuesday - Thursday 8am to 4 pm and Friday 8am to 2 pm  [address]  [phone]          Advanced Directive:   Does the patient have an appointed surrogate decision maker? No  Does the patient have a Medical Advance Directive? No  Does the patient have a Psychiatric Advance Directive? No  If the patient does not have a surrogate or Medical Advance Directive AND Psychiatric Advance Directive, the patient was offered information on these advance directives Patient declined to complete    Patient Instructions: Please continue all medications until otherwise directed by physician. Tobacco Cessation Discharge Plan:   Is the patient a smoker and needs referral for smoking cessation? No  Patient referred to the following for smoking cessation with an appointment? No     Patient was offered medication to assist with smoking cessation at discharge? No  Was education for smoking cessation added to the discharge instructions? Yes    Alcohol/Substance Abuse Discharge Plan:   Does the patient have a history of substance/alcohol abuse and requires a referral for treatment?  No  Patient referred to the following for substance/alcohol abuse treatment with an appointment? No  Patient was offered medication to assist with alcohol cessation at discharge? No  Was education for substance/alcohol abuse added to discharge instructions? No    Patient discharged to Home; discussed with patient/caregiver and provided to the patient/caregiver either in hard copy or electronically.

## 2019-11-08 NOTE — PROGRESS NOTES
Pleasant and cooperative. Visible on unit. Hyper-Sabianism, appears to be responding to internal stimuli, although denies this. . Briefly visited with  this evening. Showered.    Problem: Psychosis  Goal: *STG: Patient will verbalize areas in need of boundary recognition and limit setting  Outcome: Not Progressing Towards Goal  Goal: *STG/LTG: Complies with medication therapy  Outcome: Not Progressing Towards Goal  Goal: *STG/LTG: Demonstrates improved thought patterns as evidenced by logical and coherent speech  Outcome: Not Progressing Towards Goal

## 2019-11-08 NOTE — PROGRESS NOTES
Problem: Psychosis  Goal: *STG/LTG: Complies with medication therapy  Outcome: Not Progressing Towards Goal  Note:   Pt alert oriented. Calm pleasant. Refuses scheduled oral medication. Education provided. Pt agrees to MRI without contrast; \"short one\". MRI screening completed.       Problem: Psychosis  Goal: *STG: Remains safe in hospital  Outcome: Progressing Towards Goal     Problem: Psychosis  Goal: Interventions  Outcome: Progressing Towards Goal

## 2019-11-08 NOTE — DISCHARGE SUMMARY
PSYCHIATRIC DISCHARGE SUMMARY      Patient: Meseret Navarro MRN: [id number]  SSN: [id number]    YOB: 1960  Age: 63 y.o. Sex: female        Date of Admission: 11/4/2019  Date of Discharge:11/8/2019       Type of Discharge:  REGULAR     Admission data:  CHIEF COMPLAINT:  \"I don't know why I'm here. \"     HISTORY OF PRESENT ILLNESS:  The patient is a 67-year-old female who is currently admitted at [address] [place] [address] on a temporary [facility] order basis. The patient has no prior psychiatry history, no history of mental illness. She is currently being followed by Dr. Nathan Broussard, her internal medicine doctor, and was diagnosed with pituitary adenoma. She saw her primary care physician yesterday. She was brought in by her  and her  due to increasing agitation and bizarre behaviors. The patient went on a pilgrimage to [facility] two weeks ago with her Islam group. They went to [facility] and the patient was noted to be yelling at random people and not following the group instructions. Her Islam group was very concerned and had people watching her in her hotel room until when they attempted to get her on a flight home. She was kicked off the plane, and they tried to schedule her flight with another airline, and she was escorted off it as well. She had to wait in the airport for her  to fly there to pick her up, but when her  arrived, she was missing. A missing person was filed and she was found after two-and-a-half days wandering around the streets. Her  was able to bring her back home, but she was talking about seeing angels and yelling at people and had to have her seats moved. She was also yelling at people on the shuttle. She has been religiously preoccupied. She states  that she stays up at night praying and prays most of the day. Her  reported that the patient has barely been sleeping.   The patient is asking for her passport because she wants to travel again. When her  found her, she was very confused and disoriented. She states that her  has taken her passport, and there is nothing wrong with her. During the interview, she was religiously preoccupied. She states that if God calls her to sleep, she will sleep like a baby. States that people are ignorant. She states that she has a lot of children, all children of God are her children. She also does not think she needs medications. All she needs is the blood and body of Pete.  Her thought process is very loose and disorganized. She is very acutely psychotic. Her speech is pressured. She was admitted to the Inpatient Psychiatric Unit for further evaluation and treatment.     PAST MEDICAL HISTORY:   See H and P.     PAST PSYCHIATRIC HOSPITALIZATION:  The patient has no prior psychiatric history or mental illness. This is her first psychiatric inpatient admission.     PSYCHOSOCIAL HISTORY:  She has been  for 17 years. She has no children of her own. She is a housewife.     MENTAL STATUS EXAMINATION:  She is alert and oriented in all spheres. She reports her mood is dysphoric. Affect is reactive. Speech is pressured. Thought process is loose and disorganized. Grandeur delusions are noted. Hallucinations are evident. Memory seems intact. Intelligence seems average. Insight is poor. Judgment is poor.     DIAGNOSIS:  Unspecified psychotic disorder. Rule out schizophrenia. Hospital Course:    Patient was admitted to the Psychiatric services for acute psychiatric stabilization in regards to symptomatology as described in the HPI above and placed on Q15 minute checks and suicide precautions. Standing medications were ordered. She was started on Abilify but she was not taking it. I had a long discussion with her  Halbert Duane yesterday evening, he informed me that patient is back at her baseline and he believes patient is safe for discharge.  He also declined to start the patient on any medication. When he visited last night, he wrote in the patient's care card that patient was doing really well and he was asking if he could take the patient home.  reports that the Hinduism preoccupation has been ongoing for years and what triggered the random yelling at people was patient's difficulty of sleeping. He did not show any concerns about patient's behaviors and he felt confident that patient was well enough to go home. Petition for court ordered meds got approved today, unfortunately  is also not receptive in pharmacological interventions despite the recommendation of the primary team. While on the unit Raj Ac was involved in individual, group, occupational and milieu therapy. She was hyper Hinduism, also observed talking to herself but calm and pleasant. She has not shown any management problem in the unit and was not considered a harm to herself or others. She was appropriate in her interactions, and cooperative with medications and the unit routine. Please see individual progress notes for more specific details regarding patient's hospitalization course. Patient was discharged as per 's request. I have discussed her case with Dr. Christy Young and he is also in agreement about the discharge. No PRN medication for agitation, seclusion or restraints were required during the last 48 hours of her stay. Raj Ac was safe and stable enough to be dishcharged and outpatient FU. At this time she did not offer any complaints. Patient denied any SI or HI. Denied any AH or VH. Remains religiously preoccupied. Was not considered a danger to self or to others and is safe for discharge. Will FU with her appointments and remains motivated to be in treatment. The patient verbalized understanding of her discharge instructions.          Allergies:(reviewed/updated 11/8/2019)  Allergies   Allergen Reactions    Contrast Agent [Iodine] Rash    Penicillin G Other (comments)     In childhood reaction unknown       Side Effects: (reviewed/updated 11/8/2019)  None reported or admitted to. Vital Signs:  Patient Vitals for the past 24 hrs:   Temp Pulse Resp BP SpO2   11/08/19 0811 98.2 °F (36.8 °C) 87 16 119/82    11/07/19 1951 97.6 °F (36.4 °C) 76 18 124/89 99 %   11/07/19 1550 98.3 °F (36.8 °C) 65 18 (!) 144/94 100 %     Wt Readings from Last 3 Encounters:   11/05/19 53.5 kg (118 lb)   11/08/19 55.1 kg (121 lb 8 oz)   03/13/19 57.2 kg (126 lb)     Temp Readings from Last 3 Encounters:   11/08/19 98.2 °F (36.8 °C)   11/04/19 98.3 °F (36.8 °C) (Oral)   03/13/19 98.2 °F (36.8 °C) (Oral)     BP Readings from Last 3 Encounters:   11/08/19 119/82   11/04/19 (!) 150/103   03/13/19 123/77     Pulse Readings from Last 3 Encounters:   11/08/19 87   11/04/19 88   03/13/19 63       Labs: (reviewed/updated 11/8/2019)  No results found for this or any previous visit (from the past 24 hour(s)). No results found for: VALF2, VALAC, VALP, VALPR, DS6, CRBAM, CRBAMP, CARB2, XCRBAM  No results found for: [facility] EVALUATION [place]    Radiology (reviewed/updated 11/8/2019)  Ct Head Wo Cont    Result Date: 11/4/2019  EXAM: CT HEAD WO CONT INDICATION: confusion, berta COMPARISON: 2013 MR. CONTRAST: None. TECHNIQUE: Unenhanced CT of the head was performed using 5 mm images. Brain and bone windows were generated. CT dose reduction was achieved through use of a standardized protocol tailored for this examination and automatic exposure control for dose modulation. FINDINGS: The ventricles and sulci are normal in size, shape and configuration and midline. There is no significant white matter disease. There is no intracranial hemorrhage, extra-axial collection, mass, mass effect or midline shift. The basilar cisterns are open. No acute infarct is identified. The bone windows demonstrate no abnormalities. The visualized portions of the paranasal sinuses and mastoid air cells are clear.      IMPRESSION: No acute process identified       Mental Status Exam on Discharge:  General appearance:   Jose De Jesus Ford is a 63 y.o. WHITE OR  female who is well groomed, psychomotor activity is WNL  Eye contact: makes good eye contact  Speech: Spontaneous and coherent  Affect : Euthymic  Mood: \"OK\"  Thought Process: Logical, goal directed  Perception: Denies any AH or VH. Thought Content: Denies any SI or Plan  Insight: Poor  Judgement: Poor  Cognition: Intact grossly. Discharge Diagnosis:   Unspecified psychotic disorder. There are no discharge medications for this patient. Follow-up Information     Follow up With Specialties Details Why Contact Info    Meghan Smith MD Internal Medicine Schedule an appointment as soon as possible for a visit  [facility] [phone]  [phone]      [facility]   you can walk in for same day access Monday 8am to 2 pm Tuesday - Thursday 8am to 4 pm and Friday 8am to 2 pm  [address]  [phone]        WOUND CARE: none needed. Prognosis:   Good / [place] based on nature of patient's pathology/ies and treatment compliance issues. Prognosis is greatly dependent upon patient's ability to  follow up on psychiatric/psychotherapy appointments as well as to comply with psychiatric medications as prescribed. I certify that this patients inpatient psychiatric hospital services furnished since the previous certification were, and continue to be, required for treatment that could reasonably be expected to improve the patient's condition, or for diagnostic study, and that the patient continues to need, on a daily basis, active treatment furnished directly by or requiring the supervision of inpatient psychiatric facility personnel. In addition, the hospital records show that services furnished were intensive treatment services, admission or related services, or equivalent services. Signed:  Berta Archer NP  11/8/2019

## 2019-11-08 NOTE — PROGRESS NOTES
Pharmacist Discharge Medication Reconciliation    Discharging Provider: Lynne Weeks NP    Significant PMH: History reviewed. No pertinent past medical history. Chief Complaint for this Admission:   Chief Complaint   Patient presents with    Mental Health Problem     Allergies: Contrast agent [iodine] and Penicillin g    Discharge Medications: There are no discharge medications for this patient.     The patient's chart, MAR and AVS were reviewed by Whitney Oakley PHARMD.

## 2019-11-08 NOTE — BH NOTES
Pt.  Had hearing on  Court ordered medications  Today  Pt.  Now ordered to take medications and allow tests prescribed by physican

## 2019-11-08 NOTE — BH NOTES
1410- pt leaving unit for MRI. BHT with pt.  1515- pt returns to unit from MRI. NAD. Alert oriented. Calm cooperative. Plan is discharge home with . No prescriptions provided. Follow up information provided, contact information provided. Opportunity for questions and clarification provided. Pt verbalizes understanding of information provided. Crisis plan reviewed; Pt verbalizes understanding.

## 2019-11-12 ENCOUNTER — PATIENT OUTREACH (OUTPATIENT)
Dept: FAMILY MEDICINE CLINIC | Age: 59
End: 2019-11-12

## 2019-11-12 NOTE — PROGRESS NOTES
Hospital Discharge Follow-Up      Date/Time:  11/12/2019 9:05 AM    Patient was admitted to [facility] [place] on 11/4/19 and discharged on 11/8/19 for bipolar affective disorder. The physician discharge summary was available at the time of outreach. Patient was contacted within two business days of discharge. Initial outreach encounter was unsuccessful. Top Challenges reviewed with the provider   Advance Care Planning:   Does patient have an Advance Directive:  not on file          Inpatient RRAT score: 0  Was this a readmission? no   Patient stated reason for the readmission: NA      Home Health orders at discharge: none      Medication(s):   New Medications at Discharge: NA  Changed Medications at Discharge: NA  Discontinued Medications at Discharge: NA  . Referral to Pharm D needed: no     [facility] follow up appointment(s): No future appointments.

## 2019-11-22 ENCOUNTER — PATIENT OUTREACH (OUTPATIENT)
Dept: FAMILY MEDICINE CLINIC | Age: 59
End: 2019-11-22

## 2019-11-22 NOTE — PROGRESS NOTES
Called patient to follow up  Female answered phone  CTN introduced self  CTN asked to confirm name/. . female   refused stated, \"you have that information in front of you and Im not doing that over the phone\"  States she is doing beautifully  Would not go over medications  Female stated, \"you have a ayse day.  I love you\"  Phone disconnected

## 2020-03-30 PROBLEM — Z86.018 HISTORY OF PITUITARY ADENOMA: Status: ACTIVE | Noted: 2020-03-30

## 2020-08-04 ENCOUNTER — HOSPITAL ENCOUNTER (OUTPATIENT)
Dept: LAB | Age: 60
Discharge: HOME | End: 2020-08-04

## 2020-08-04 ENCOUNTER — OFFICE VISIT (OUTPATIENT)
Dept: INTERNAL MEDICINE CLINIC | Age: 60
End: 2020-08-04
Payer: COMMERCIAL

## 2020-08-04 VITALS
SYSTOLIC BLOOD PRESSURE: 123 MMHG | HEIGHT: 67 IN | HEART RATE: 79 BPM | DIASTOLIC BLOOD PRESSURE: 87 MMHG | RESPIRATION RATE: 16 BRPM | BODY MASS INDEX: 18.52 KG/M2 | OXYGEN SATURATION: 98 % | WEIGHT: 118 LBS

## 2020-08-04 DIAGNOSIS — K90.0 CELIAC DISEASE: ICD-10-CM

## 2020-08-04 DIAGNOSIS — L72.0 INCLUSION CYST: ICD-10-CM

## 2020-08-04 DIAGNOSIS — R45.1 RESTLESSNESS AND AGITATION: ICD-10-CM

## 2020-08-04 DIAGNOSIS — D35.2 BENIGN NEOPLASM OF PITUITARY GLAND AND CRANIOPHARYNGEAL DUCT (POUCH) (HCC): ICD-10-CM

## 2020-08-04 DIAGNOSIS — K90.0 ADULT CELIAC DISEASE: ICD-10-CM

## 2020-08-04 DIAGNOSIS — D35.3 BENIGN NEOPLASM OF PITUITARY GLAND AND CRANIOPHARYNGEAL DUCT (POUCH) (HCC): ICD-10-CM

## 2020-08-04 DIAGNOSIS — F31.9 BIPOLAR 1 DISORDER (HCC): ICD-10-CM

## 2020-08-04 DIAGNOSIS — D35.2 PITUITARY ADENOMA (HCC): ICD-10-CM

## 2020-08-04 DIAGNOSIS — D35.2 PITUITARY ADENOMA (HCC): Primary | ICD-10-CM

## 2020-08-04 LAB
ALBUMIN SERPL-MCNC: 4.7 G/DL (ref 3.5–5)
ALBUMIN/GLOB SERPL: 1.5 {RATIO} (ref 1.1–2.2)
ALP SERPL-CCNC: 104 U/L (ref 45–117)
ALT SERPL-CCNC: 17 U/L (ref 12–78)
ANION GAP SERPL CALC-SCNC: 8 MMOL/L (ref 5–15)
AST SERPL-CCNC: 13 U/L (ref 15–37)
BASOPHILS # BLD: 0 K/UL (ref 0–0.1)
BASOPHILS NFR BLD: 0 % (ref 0–1)
BILIRUB SERPL-MCNC: 0.6 MG/DL (ref 0.2–1)
BUN SERPL-MCNC: 9 MG/DL (ref 6–20)
BUN/CREAT SERPL: 12 (ref 12–20)
CALCIUM SERPL-MCNC: 9.4 MG/DL (ref 8.5–10.1)
CHLORIDE SERPL-SCNC: 107 MMOL/L (ref 97–108)
CO2 SERPL-SCNC: 24 MMOL/L (ref 21–32)
COMMENT, HOLDF: NORMAL
CREAT SERPL-MCNC: 0.74 MG/DL (ref 0.55–1.02)
DIFFERENTIAL METHOD BLD: NORMAL
EOSINOPHIL # BLD: 0 K/UL (ref 0–0.4)
EOSINOPHIL NFR BLD: 0 % (ref 0–7)
ERYTHROCYTE [DISTWIDTH] IN BLOOD BY AUTOMATED COUNT: 11.8 % (ref 11.5–14.5)
GLOBULIN SER CALC-MCNC: 3.2 G/DL (ref 2–4)
GLUCOSE SERPL-MCNC: 80 MG/DL (ref 65–100)
HCT VFR BLD AUTO: 42.8 % (ref 35–47)
HGB BLD-MCNC: 13.9 G/DL (ref 11.5–16)
IMM GRANULOCYTES # BLD AUTO: 0 K/UL (ref 0–0.04)
IMM GRANULOCYTES NFR BLD AUTO: 0 % (ref 0–0.5)
LYMPHOCYTES # BLD: 1.3 K/UL (ref 0.8–3.5)
LYMPHOCYTES NFR BLD: 25 % (ref 12–49)
MCH RBC QN AUTO: 29.9 PG (ref 26–34)
MCHC RBC AUTO-ENTMCNC: 32.5 G/DL (ref 30–36.5)
MCV RBC AUTO: 92 FL (ref 80–99)
MONOCYTES # BLD: 0.4 K/UL (ref 0–1)
MONOCYTES NFR BLD: 8 % (ref 5–13)
NEUTS SEG # BLD: 3.3 K/UL (ref 1.8–8)
NEUTS SEG NFR BLD: 67 % (ref 32–75)
NRBC # BLD: 0 K/UL (ref 0–0.01)
NRBC BLD-RTO: 0 PER 100 WBC
PLATELET # BLD AUTO: 224 K/UL (ref 150–400)
PMV BLD AUTO: 11.7 FL (ref 8.9–12.9)
POTASSIUM SERPL-SCNC: 4 MMOL/L (ref 3.5–5.1)
PROLACTIN SERPL-MCNC: 7.6 NG/ML
PROT SERPL-MCNC: 7.9 G/DL (ref 6.4–8.2)
RBC # BLD AUTO: 4.65 M/UL (ref 3.8–5.2)
SAMPLES BEING HELD,HOLD: NORMAL
SODIUM SERPL-SCNC: 139 MMOL/L (ref 136–145)
T4 FREE SERPL-MCNC: 1.1 NG/DL (ref 0.8–1.5)
TSH SERPL DL<=0.05 MIU/L-ACNC: 2.15 UIU/ML (ref 0.36–3.74)
WBC # BLD AUTO: 5 K/UL (ref 3.6–11)

## 2020-08-04 PROCEDURE — 99214 OFFICE O/P EST MOD 30 MIN: CPT | Performed by: INTERNAL MEDICINE

## 2020-08-04 NOTE — PROGRESS NOTES
HISTORY OF PRESENT ILLNESS  Beatrice Domínguez is a 62 y.o. female. HPI  She has not been seen since November, when she was hospitalized at [address] [place] [address] with psychotic manic episode, was diagnosed with bipolar disorder with berta. She had a lot of hyper-Jew talk at that time. She declined medication, has not had follow up since that time. Comes in now, really concerned about a cyst on her back, which has grown. It is not draining or painful, no fevers. Does have a history of a pituitary microadenoma. Denies changes in vision or headaches, denies trouble sleeping. When asked, she tells me she prays when she needs to at night and sleeps when God lets her. Denies concerns about her mood. She has recently been spending a lot of time at the beach. Review of Systems   Constitutional: Negative for chills, fever, malaise/fatigue and weight loss. Respiratory: Negative for cough, shortness of breath and wheezing. Cardiovascular: Negative for chest pain, palpitations, orthopnea, leg swelling and PND. Gastrointestinal: Negative for heartburn and nausea. Musculoskeletal: Negative for myalgias. Neurological: Negative for dizziness and headaches. Psychiatric/Behavioral: Negative for depression. The patient is not nervous/anxious and does not have insomnia. Physical Exam  Vitals signs and nursing note reviewed. Constitutional:       Appearance: She is well-developed. HENT:      Head: Normocephalic and atraumatic. Neck:      Musculoskeletal: Normal range of motion and neck supple. Thyroid: No thyromegaly. Vascular: No carotid bruit. Cardiovascular:      Rate and Rhythm: Normal rate and regular rhythm. Heart sounds: Normal heart sounds, S1 normal and S2 normal. No murmur. Pulmonary:      Effort: Pulmonary effort is normal. No respiratory distress. Breath sounds: Normal breath sounds. No wheezing or rales.    Skin:     Comments: Small inclusion  Cyst upper back  Midline no drainage and not red or warm   Neurological:      Mental Status: She is alert and oriented to person, place, and time. Psychiatric:      Comments:  Speaks about god and praying often during our visit         ASSESSMENT and PLAN  Diagnoses and all orders for this visit:    1. [facility]  -     TSH 3RD GENERATION; Future  -     PROLACTIN; Future  -     T4, FREE; Future  -     CBC WITH AUTOMATED DIFF; Future    2. Restlessness and agitation-with ongoing sxs of berta  Mild-she declines care     3. Adult celiac disease  -     METABOLIC PANEL, COMPREHENSIVE; Future    4.  Inclusion cyst  -     REFERRAL TO GENERAL SURGERY    5. Bipolar 1 disorder ([facility]

## 2021-04-24 ENCOUNTER — IMMUNIZATION (OUTPATIENT)
Dept: INTERNAL MEDICINE CLINIC | Age: 61
End: 2021-04-24
Payer: COMMERCIAL

## 2021-04-24 DIAGNOSIS — Z23 ENCOUNTER FOR IMMUNIZATION: Primary | ICD-10-CM

## 2021-04-24 PROCEDURE — 0001A COVID-19, MRNA, LNP-S, PF, 30MCG/0.3ML DOSE(PFIZER): CPT | Performed by: FAMILY MEDICINE

## 2021-04-24 PROCEDURE — 91300 COVID-19, MRNA, LNP-S, PF, 30MCG/0.3ML DOSE(PFIZER): CPT | Performed by: FAMILY MEDICINE

## 2021-05-15 ENCOUNTER — IMMUNIZATION (OUTPATIENT)
Dept: INTERNAL MEDICINE CLINIC | Age: 61
End: 2021-05-15
Payer: COMMERCIAL

## 2021-05-15 DIAGNOSIS — Z23 ENCOUNTER FOR IMMUNIZATION: Primary | ICD-10-CM

## 2021-05-15 PROCEDURE — 0002A COVID-19, MRNA, LNP-S, PF, 30MCG/0.3ML DOSE(PFIZER): CPT | Performed by: FAMILY MEDICINE

## 2021-05-15 PROCEDURE — 91300 COVID-19, MRNA, LNP-S, PF, 30MCG/0.3ML DOSE(PFIZER): CPT | Performed by: FAMILY MEDICINE

## 2023-11-17 NOTE — DISCHARGE INSTRUCTIONS
DISCHARGE SUMMARY    NAME:Leslie Mesa  :   MRN: [id number]    The patient Lisbet Ortez exhibits the ability to control behavior in a less restrictive environment. Patient's level of functioning is improving. No assaultive/destructive behavior has been observed for the past 24 hours. No suicidal/homicidal threat or behavior has been observed for the past 24 hours. There is no evidence of serious medication side effects. Patient has not been in physical or protective restraints for at least the past 24 hours. If weapons involved, how are they secured? No weapons involved     Is patient aware of and in agreement with discharge plan? Patient and family are aware of discharge and are in agreement    Arrangements for medication: no prescriptions given to patient. Copy of discharge instructions to provider?:  Yes, fax to [address] [place] [address] for transportation home:   to      Keep all follow up appointments as scheduled, continue to take prescribed medications per physician instructions. Mental health crisis number:  [phone] or your local mental health crisis line number at [phone]     Patient is discharging: No prescriptions provided. Patient elected to not take medications during admission. DISCHARGE SUMMARY from Nurse    PATIENT INSTRUCTIONS:    What to do at Home:  Recommended activity: Activity as tolerated. If you experience any of the following symptoms hopeless, helpless, overwhelming thoughts of harming self or others, uncontrolled racing thoughts or Zoroastrianism thoughts, please call 911 or your local mental health crisis line number at [phone]. *  Please give a list of your current medications to your Primary Care Provider. *  Please update this list whenever your medications are discontinued, doses are      changed, or new medications (including over-the-counter products) are added.     *  Please carry medication information at all times in case of emergency Sheyla JomarurielCathleen is a 38 y.o. female who presents today for annual physical. Age appropriate screening and routine maintenance reviewed and updated today.    situations. These are general instructions for a healthy lifestyle:    No smoking/ No tobacco products/ Avoid exposure to second hand smoke  Surgeon General's Warning:  Quitting smoking now greatly reduces serious risk to your health. Obesity, smoking, and sedentary lifestyle greatly increases your risk for illness    A healthy diet, regular physical exercise & weight monitoring are important for maintaining a healthy lifestyle    You may be retaining fluid if you have a history of heart failure or if you experience any of the following symptoms:  Weight gain of 3 pounds or more overnight or 5 pounds in a week, increased swelling in our hands or feet or shortness of breath while lying flat in bed. Please call your doctor as soon as you notice any of these symptoms; do not wait until your next office visit. The discharge information has been reviewed with the patient. The patient verbalized understanding. Discharge medications reviewed with the patient and appropriate educational materials and side effects teaching were provided.   ___________________________________________________________________________________________________________________________________